# Patient Record
Sex: FEMALE | Race: WHITE | NOT HISPANIC OR LATINO | Employment: FULL TIME | URBAN - METROPOLITAN AREA
[De-identification: names, ages, dates, MRNs, and addresses within clinical notes are randomized per-mention and may not be internally consistent; named-entity substitution may affect disease eponyms.]

---

## 2023-03-07 DIAGNOSIS — E66.01 CLASS 3 SEVERE OBESITY DUE TO EXCESS CALORIES WITH SERIOUS COMORBIDITY AND BODY MASS INDEX (BMI) OF 40.0 TO 44.9 IN ADULT (MULTI): Primary | ICD-10-CM

## 2023-03-07 PROBLEM — R20.2 PARESTHESIA OF BOTH FEET: Status: ACTIVE | Noted: 2023-03-07

## 2023-03-07 PROBLEM — M54.12 CERVICAL RADICULITIS: Status: ACTIVE | Noted: 2022-12-02

## 2023-03-07 PROBLEM — E03.9 HYPOTHYROIDISM: Status: ACTIVE | Noted: 2023-03-07

## 2023-03-07 PROBLEM — R10.30 LOWER ABDOMINAL PAIN: Status: ACTIVE | Noted: 2023-03-07

## 2023-03-07 PROBLEM — R05.8 ACE-INHIBITOR COUGH: Status: ACTIVE | Noted: 2023-03-07

## 2023-03-07 PROBLEM — E11.40 DIABETIC NEUROPATHY (MULTI): Status: ACTIVE | Noted: 2023-03-07

## 2023-03-07 PROBLEM — Z85.41 HISTORY OF CERVICAL CANCER: Status: ACTIVE | Noted: 2023-03-07

## 2023-03-07 PROBLEM — M79.10 MUSCLE PAIN: Status: ACTIVE | Noted: 2023-03-07

## 2023-03-07 PROBLEM — M76.62 ACHILLES TENDINITIS OF LEFT LOWER EXTREMITY: Status: ACTIVE | Noted: 2023-03-07

## 2023-03-07 PROBLEM — R51.9 HEADACHE: Status: ACTIVE | Noted: 2023-03-07

## 2023-03-07 PROBLEM — R19.7 DIARRHEA, UNSPECIFIED: Status: ACTIVE | Noted: 2023-03-07

## 2023-03-07 PROBLEM — R42 DIZZINESS: Status: ACTIVE | Noted: 2023-03-07

## 2023-03-07 PROBLEM — E11.9 DIABETES MELLITUS (MULTI): Status: ACTIVE | Noted: 2023-03-07

## 2023-03-07 PROBLEM — T46.4X5A ACE-INHIBITOR COUGH: Status: ACTIVE | Noted: 2023-03-07

## 2023-03-07 PROBLEM — K21.9 GERD (GASTROESOPHAGEAL REFLUX DISEASE): Status: ACTIVE | Noted: 2023-03-07

## 2023-03-07 PROBLEM — M25.512 LEFT SHOULDER PAIN: Status: ACTIVE | Noted: 2023-03-07

## 2023-03-07 PROBLEM — D23.9 DYSPLASTIC NEVUS: Status: ACTIVE | Noted: 2023-03-07

## 2023-03-07 PROBLEM — R07.9 CHEST PAIN, EXERTIONAL: Status: ACTIVE | Noted: 2023-03-07

## 2023-03-07 PROBLEM — N20.0 KIDNEY STONE: Status: ACTIVE | Noted: 2023-03-07

## 2023-03-07 PROBLEM — E66.813 OBESITY, CLASS III, BMI 40-49.9 (MORBID OBESITY): Status: ACTIVE | Noted: 2023-03-07

## 2023-03-07 PROBLEM — N95.2 ATROPHY OF VAGINA: Status: ACTIVE | Noted: 2023-03-07

## 2023-03-07 PROBLEM — M89.9 LESION OF VERTEBRA: Status: ACTIVE | Noted: 2023-03-07

## 2023-03-07 PROBLEM — J06.9 URI (UPPER RESPIRATORY INFECTION): Status: ACTIVE | Noted: 2023-03-07

## 2023-03-07 PROBLEM — J20.9 ACUTE BRONCHITIS: Status: ACTIVE | Noted: 2023-03-07

## 2023-03-07 PROBLEM — K52.9 CHRONIC DIARRHEA: Status: ACTIVE | Noted: 2023-03-07

## 2023-03-07 PROBLEM — H66.90 OTITIS MEDIA: Status: ACTIVE | Noted: 2023-03-07

## 2023-03-07 PROBLEM — M16.9 OSTEOARTHRITIS OF HIP: Status: ACTIVE | Noted: 2023-03-07

## 2023-03-07 PROBLEM — B34.2 CORONAVIRUS INFECTION: Status: ACTIVE | Noted: 2022-11-29
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
## 2023-03-07 PROBLEM — H92.02 LEFT EAR PAIN: Status: ACTIVE | Noted: 2023-03-07

## 2023-03-07 PROBLEM — K31.9 LESION OF STOMACH: Status: ACTIVE | Noted: 2023-03-07

## 2023-03-07 PROBLEM — M25.552 LEFT HIP PAIN: Status: ACTIVE | Noted: 2023-03-07

## 2023-03-07 PROBLEM — I10 BENIGN ESSENTIAL HYPERTENSION: Status: ACTIVE | Noted: 2023-03-07

## 2023-03-07 PROBLEM — R07.9 CHEST PAIN: Status: ACTIVE | Noted: 2023-03-07

## 2023-03-07 PROBLEM — R05.9 COUGH: Status: ACTIVE | Noted: 2023-03-07

## 2023-03-07 PROBLEM — N63.20 MASS OF LEFT BREAST ON MAMMOGRAM: Status: ACTIVE | Noted: 2023-03-07

## 2023-03-07 PROBLEM — M54.2 NECK PAIN: Status: ACTIVE | Noted: 2023-03-07

## 2023-03-07 PROBLEM — J01.90 ACUTE SINUSITIS: Status: ACTIVE | Noted: 2023-03-07

## 2023-03-07 RX ORDER — POTASSIUM CITRATE 10 MEQ/1
10 TABLET, EXTENDED RELEASE ORAL
COMMUNITY
Start: 2017-06-12 | End: 2024-05-03 | Stop reason: WASHOUT

## 2023-03-07 RX ORDER — ATORVASTATIN CALCIUM 40 MG/1
40 TABLET, FILM COATED ORAL DAILY
COMMUNITY
Start: 2018-11-15 | End: 2023-08-28 | Stop reason: SDUPTHER

## 2023-03-07 RX ORDER — BLOOD SUGAR DIAGNOSTIC
STRIP MISCELLANEOUS
COMMUNITY
Start: 2022-08-23

## 2023-03-07 RX ORDER — SEMAGLUTIDE 1.34 MG/ML
1 INJECTION, SOLUTION SUBCUTANEOUS
Qty: 2 EACH | Refills: 3 | Status: SHIPPED | OUTPATIENT
Start: 2023-03-07 | End: 2023-03-16

## 2023-03-07 RX ORDER — LANCETS
EACH MISCELLANEOUS
COMMUNITY

## 2023-03-07 RX ORDER — LEVOTHYROXINE SODIUM 137 UG/1
137 TABLET ORAL DAILY
COMMUNITY
Start: 2014-06-13 | End: 2023-03-16 | Stop reason: SDUPTHER

## 2023-03-07 RX ORDER — GLIPIZIDE 10 MG/1
10 TABLET, FILM COATED, EXTENDED RELEASE ORAL 2 TIMES DAILY
COMMUNITY
Start: 2018-11-15 | End: 2023-06-01 | Stop reason: SDUPTHER

## 2023-03-07 RX ORDER — PIOGLITAZONEHYDROCHLORIDE 30 MG/1
30 TABLET ORAL DAILY
COMMUNITY
Start: 2020-12-17 | End: 2023-08-28 | Stop reason: SDUPTHER

## 2023-03-07 RX ORDER — LOSARTAN POTASSIUM 50 MG/1
50 TABLET ORAL DAILY
Status: CANCELLED | OUTPATIENT
Start: 2023-03-07

## 2023-03-07 RX ORDER — GABAPENTIN 300 MG/1
300 CAPSULE ORAL
COMMUNITY
Start: 2019-04-08 | End: 2023-12-22 | Stop reason: SDUPTHER

## 2023-03-07 RX ORDER — LEVOTHYROXINE SODIUM 137 UG/1
137 TABLET ORAL DAILY
Status: CANCELLED | OUTPATIENT
Start: 2023-03-07

## 2023-03-07 RX ORDER — IBUPROFEN 800 MG/1
800 TABLET ORAL EVERY 8 HOURS PRN
COMMUNITY

## 2023-03-07 RX ORDER — PREDNISONE 20 MG/1
TABLET ORAL
COMMUNITY
Start: 2022-10-28 | End: 2023-03-16 | Stop reason: ALTCHOICE

## 2023-03-07 RX ORDER — SEMAGLUTIDE 1.34 MG/ML
INJECTION, SOLUTION SUBCUTANEOUS
COMMUNITY
Start: 2022-06-13 | End: 2023-03-16

## 2023-03-07 RX ORDER — ALLOPURINOL 300 MG/1
300 TABLET ORAL DAILY
COMMUNITY
Start: 2017-06-12 | End: 2023-05-22 | Stop reason: SDUPTHER

## 2023-03-07 RX ORDER — CYCLOBENZAPRINE HCL 10 MG
1 TABLET ORAL 2 TIMES DAILY
COMMUNITY
Start: 2022-10-28 | End: 2023-05-25 | Stop reason: ALTCHOICE

## 2023-03-07 RX ORDER — LOSARTAN POTASSIUM 50 MG/1
50 TABLET ORAL DAILY
COMMUNITY
Start: 2019-04-08 | End: 2023-03-16 | Stop reason: SDUPTHER

## 2023-03-07 RX ORDER — DICYCLOMINE HYDROCHLORIDE 10 MG/1
10 CAPSULE ORAL
COMMUNITY
Start: 2018-12-14 | End: 2023-10-02 | Stop reason: SDUPTHER

## 2023-03-07 RX ORDER — SEMAGLUTIDE 1.34 MG/ML
INJECTION, SOLUTION SUBCUTANEOUS
COMMUNITY
Start: 2022-07-10 | End: 2023-03-07 | Stop reason: SDUPTHER

## 2023-03-07 NOTE — TELEPHONE ENCOUNTER
Refill    Ozempic 4mg solution inj 1mg once a week     Pharm: [facility] # [phone]    LR: 07/14/22 qty 3 w/ 1 refill  LV: 11/29/22  NV: no future apt

## 2023-03-16 ENCOUNTER — OFFICE VISIT (OUTPATIENT)
Dept: PRIMARY CARE | Facility: CLINIC | Age: 68
End: 2023-03-16
Payer: MEDICARE

## 2023-03-16 VITALS
SYSTOLIC BLOOD PRESSURE: 130 MMHG | DIASTOLIC BLOOD PRESSURE: 90 MMHG | WEIGHT: 250 LBS | HEIGHT: 64 IN | TEMPERATURE: 97.2 F | BODY MASS INDEX: 42.68 KG/M2

## 2023-03-16 DIAGNOSIS — E03.9 ACQUIRED HYPOTHYROIDISM: ICD-10-CM

## 2023-03-16 DIAGNOSIS — E66.01 OBESITY, CLASS III, BMI 40-49.9 (MORBID OBESITY) (MULTI): ICD-10-CM

## 2023-03-16 DIAGNOSIS — E11.69 TYPE 2 DIABETES MELLITUS WITH OTHER SPECIFIED COMPLICATION, WITHOUT LONG-TERM CURRENT USE OF INSULIN (MULTI): Primary | ICD-10-CM

## 2023-03-16 DIAGNOSIS — E11.42 DIABETIC POLYNEUROPATHY ASSOCIATED WITH TYPE 2 DIABETES MELLITUS (MULTI): ICD-10-CM

## 2023-03-16 DIAGNOSIS — I10 BENIGN ESSENTIAL HYPERTENSION: ICD-10-CM

## 2023-03-16 LAB
BASOPHILS (10*3/UL) IN BLOOD BY AUTOMATED COUNT: 0.03 X10E9/L (ref 0–0.1)
BASOPHILS/100 LEUKOCYTES IN BLOOD BY AUTOMATED COUNT: 0.4 % (ref 0–2)
EOSINOPHILS (10*3/UL) IN BLOOD BY AUTOMATED COUNT: 0.06 X10E9/L (ref 0–0.7)
EOSINOPHILS/100 LEUKOCYTES IN BLOOD BY AUTOMATED COUNT: 0.8 % (ref 0–6)
ERYTHROCYTE DISTRIBUTION WIDTH (RATIO) BY AUTOMATED COUNT: 12.6 % (ref 11.5–14.5)
ERYTHROCYTE MEAN CORPUSCULAR HEMOGLOBIN CONCENTRATION (G/DL) BY AUTOMATED: 32.2 G/DL (ref 32–36)
ERYTHROCYTE MEAN CORPUSCULAR VOLUME (FL) BY AUTOMATED COUNT: 97 FL (ref 80–100)
ERYTHROCYTES (10*6/UL) IN BLOOD BY AUTOMATED COUNT: 4.3 X10E12/L (ref 4–5.2)
ESTIMATED AVERAGE GLUCOSE FOR HBA1C: 137 MG/DL
HEMATOCRIT (%) IN BLOOD BY AUTOMATED COUNT: 41.6 % (ref 36–46)
HEMOGLOBIN (G/DL) IN BLOOD: 13.4 G/DL (ref 12–16)
HEMOGLOBIN A1C/HEMOGLOBIN TOTAL IN BLOOD: 6.4 %
IMMATURE GRANULOCYTES/100 LEUKOCYTES IN BLOOD BY AUTOMATED COUNT: 0.1 % (ref 0–0.9)
LEUKOCYTES (10*3/UL) IN BLOOD BY AUTOMATED COUNT: 7.1 X10E9/L (ref 4.4–11.3)
LYMPHOCYTES (10*3/UL) IN BLOOD BY AUTOMATED COUNT: 2.11 X10E9/L (ref 1.2–4.8)
LYMPHOCYTES/100 LEUKOCYTES IN BLOOD BY AUTOMATED COUNT: 29.8 % (ref 13–44)
MONOCYTES (10*3/UL) IN BLOOD BY AUTOMATED COUNT: 0.72 X10E9/L (ref 0.1–1)
MONOCYTES/100 LEUKOCYTES IN BLOOD BY AUTOMATED COUNT: 10.2 % (ref 2–10)
NEUTROPHILS (10*3/UL) IN BLOOD BY AUTOMATED COUNT: 4.16 X10E9/L (ref 1.2–7.7)
NEUTROPHILS/100 LEUKOCYTES IN BLOOD BY AUTOMATED COUNT: 58.7 % (ref 40–80)
NRBC (PER 100 WBCS) BY AUTOMATED COUNT: 0 /100 WBC (ref 0–0)
PLATELETS (10*3/UL) IN BLOOD AUTOMATED COUNT: 253 X10E9/L (ref 150–450)

## 2023-03-16 PROCEDURE — 3080F DIAST BP >= 90 MM HG: CPT | Performed by: FAMILY MEDICINE

## 2023-03-16 PROCEDURE — 84443 ASSAY THYROID STIM HORMONE: CPT

## 2023-03-16 PROCEDURE — 83036 HEMOGLOBIN GLYCOSYLATED A1C: CPT

## 2023-03-16 PROCEDURE — 85025 COMPLETE CBC W/AUTO DIFF WBC: CPT

## 2023-03-16 PROCEDURE — 99214 OFFICE O/P EST MOD 30 MIN: CPT | Performed by: FAMILY MEDICINE

## 2023-03-16 PROCEDURE — 80053 COMPREHEN METABOLIC PANEL: CPT

## 2023-03-16 PROCEDURE — 36415 COLL VENOUS BLD VENIPUNCTURE: CPT | Performed by: FAMILY MEDICINE

## 2023-03-16 PROCEDURE — 3075F SYST BP GE 130 - 139MM HG: CPT | Performed by: FAMILY MEDICINE

## 2023-03-16 PROCEDURE — 1159F MED LIST DOCD IN RCRD: CPT | Performed by: FAMILY MEDICINE

## 2023-03-16 PROCEDURE — 1160F RVW MEDS BY RX/DR IN RCRD: CPT | Performed by: FAMILY MEDICINE

## 2023-03-16 PROCEDURE — 80061 LIPID PANEL: CPT

## 2023-03-16 PROCEDURE — 1036F TOBACCO NON-USER: CPT | Performed by: FAMILY MEDICINE

## 2023-03-16 PROCEDURE — 4010F ACE/ARB THERAPY RXD/TAKEN: CPT | Performed by: FAMILY MEDICINE

## 2023-03-16 PROCEDURE — 3008F BODY MASS INDEX DOCD: CPT | Performed by: FAMILY MEDICINE

## 2023-03-16 RX ORDER — LOSARTAN POTASSIUM 50 MG/1
50 TABLET ORAL DAILY
Qty: 90 TABLET | Refills: 1 | Status: SHIPPED | OUTPATIENT
Start: 2023-03-16 | End: 2023-05-25 | Stop reason: SDUPTHER

## 2023-03-16 RX ORDER — LEVOTHYROXINE SODIUM 137 UG/1
137 TABLET ORAL DAILY
Qty: 90 TABLET | Refills: 1 | Status: SHIPPED | OUTPATIENT
Start: 2023-03-16 | End: 2023-10-02 | Stop reason: SDUPTHER

## 2023-03-16 ASSESSMENT — ENCOUNTER SYMPTOMS
CONSTIPATION: 0
FEVER: 0
DIZZINESS: 0
NUMBNESS: 1
HEMATURIA: 0
FATIGUE: 0
HEADACHES: 0
ABDOMINAL PAIN: 0
COUGH: 0
DYSURIA: 0
SHORTNESS OF BREATH: 0
BLOOD IN STOOL: 0
LIGHT-HEADEDNESS: 0

## 2023-03-16 ASSESSMENT — PATIENT HEALTH QUESTIONNAIRE - PHQ9
1. LITTLE INTEREST OR PLEASURE IN DOING THINGS: NOT AT ALL
SUM OF ALL RESPONSES TO PHQ9 QUESTIONS 1 AND 2: 0
2. FEELING DOWN, DEPRESSED OR HOPELESS: NOT AT ALL

## 2023-03-16 NOTE — PATIENT INSTRUCTIONS
I will refill medication and order labs to recheck your diabetes and your thyroid.  Weight loss is recommended for your good health.  I will refer you to a nutritionist.  I recommend cutting portion sizes and avoiding foods high in sugars and starches.  Return in three months for a recheck.  We will stop the Ozempic due to the cost of this medication.

## 2023-03-16 NOTE — PROGRESS NOTES
"Subjective   Patient ID: [id number]     Mary Ellen Gomez is a 67 y.o. female who presents for Follow-up (Would like to discuss her Ozempic rx ).  HPI  She is here for a recheck.  She is prescribed Ozempic.  She states it is too expensive at $500 per month out of pocket.  She has been on it up until last week.      BMI 42.9.  Weight is unchanged since November 2022.      Her last A1c was 7.4 in March 2022.  /90.    Review of Systems   Constitutional:  Negative for fatigue and fever.   Respiratory:  Negative for cough and shortness of breath.    Cardiovascular:  Negative for chest pain.   Gastrointestinal:  Negative for abdominal pain, blood in stool and constipation.        Some loose stools lately.   Genitourinary:  Negative for dysuria and hematuria.   Skin:         No wounds or sores on the feet.   Neurological:  Positive for numbness (in the toes of both feet. more on the right.). Negative for dizziness, light-headedness and headaches.      Objective     /90 (BP Location: Right arm, Patient Position: Sitting)   Temp 36.2 °C (97.2 °F)   Ht 1.626 m (5' 4\")   Wt 113 kg (250 lb)   BMI 42.91 kg/m²      Physical Exam  Constitutional:       Appearance: Normal appearance.   Cardiovascular:      Rate and Rhythm: Normal rate and regular rhythm.   Pulmonary:      Effort: Pulmonary effort is normal.      Breath sounds: Normal breath sounds.   Musculoskeletal:      Right lower leg: No edema.      Left lower leg: No edema.   Neurological:      General: No focal deficit present.      Mental Status: She is alert and oriented to person, place, and time.   Psychiatric:         Mood and Affect: Mood normal.         Assessment/Plan   Problem List Items Addressed This Visit          Nervous    Diabetic neuropathy (CMS/HCC)       Circulatory    Benign essential hypertension    Relevant Medications    losartan (Cozaar) 50 mg tablet       Endocrine/Metabolic    Diabetes mellitus (CMS/HCC) - Primary    Relevant Orders    " Hemoglobin A1c    Lipid Panel    Comprehensive Metabolic Panel    CBC and Auto Differential    Referral to Nutrition Services    Hypothyroidism    Relevant Medications    levothyroxine (Synthroid, Levoxyl) 137 mcg tablet    Other Relevant Orders    Thyroid Stimulating Hormone    Obesity, Class III, BMI 40-49.9 (morbid obesity) (CMS/[facility])    Relevant Orders    Referral to Nutrition Services     I will refill medication and order labs to recheck your diabetes and your thyroid.  Weight loss is recommended for your good health.  I will refer you to a nutritionist.  I recommend cutting portion sizes and avoiding foods high in sugars and starches.  Return in three months for a recheck.  We will stop the Ozempic due to the cost of this medication.  Ponce Amos, DO

## 2023-03-17 ENCOUNTER — TELEPHONE (OUTPATIENT)
Dept: PRIMARY CARE | Facility: CLINIC | Age: 68
End: 2023-03-17
Payer: MEDICARE

## 2023-03-17 LAB
ALANINE AMINOTRANSFERASE (SGPT) (U/L) IN SER/PLAS: 16 U/L (ref 7–45)
ALBUMIN (G/DL) IN SER/PLAS: 4 G/DL (ref 3.4–5)
ALKALINE PHOSPHATASE (U/L) IN SER/PLAS: 54 U/L (ref 33–136)
ANION GAP IN SER/PLAS: 16 MMOL/L (ref 10–20)
ASPARTATE AMINOTRANSFERASE (SGOT) (U/L) IN SER/PLAS: 14 U/L (ref 9–39)
BILIRUBIN TOTAL (MG/DL) IN SER/PLAS: 0.9 MG/DL (ref 0–1.2)
CALCIUM (MG/DL) IN SER/PLAS: 9.2 MG/DL (ref 8.6–10.3)
CARBON DIOXIDE, TOTAL (MMOL/L) IN SER/PLAS: 24 MMOL/L (ref 21–32)
CHLORIDE (MMOL/L) IN SER/PLAS: 107 MMOL/L (ref 98–107)
CHOLESTEROL (MG/DL) IN SER/PLAS: 139 MG/DL (ref 0–199)
CHOLESTEROL IN HDL (MG/DL) IN SER/PLAS: 49.7 MG/DL
CHOLESTEROL/HDL RATIO: 2.8
CREATININE (MG/DL) IN SER/PLAS: 0.89 MG/DL (ref 0.5–1.05)
GFR FEMALE: 71 ML/MIN/1.73M2
GLUCOSE (MG/DL) IN SER/PLAS: 123 MG/DL (ref 74–99)
LDL: 70 MG/DL (ref 0–99)
POTASSIUM (MMOL/L) IN SER/PLAS: 4.5 MMOL/L (ref 3.5–5.3)
PROTEIN TOTAL: 6.5 G/DL (ref 6.4–8.2)
SODIUM (MMOL/L) IN SER/PLAS: 142 MMOL/L (ref 136–145)
THYROTROPIN (MIU/L) IN SER/PLAS BY DETECTION LIMIT <= 0.05 MIU/L: 0.58 MIU/L (ref 0.44–3.98)
TRIGLYCERIDE (MG/DL) IN SER/PLAS: 95 MG/DL (ref 0–149)
UREA NITROGEN (MG/DL) IN SER/PLAS: 22 MG/DL (ref 6–23)
VLDL: 19 MG/DL (ref 0–40)

## 2023-03-17 NOTE — TELEPHONE ENCOUNTER
Lab results discussed with patient.      ----- Message from Ponce Amos DO sent at 3/17/2023  2:51 PM EDT -----  Please let her know her labs showed no significant problems.  Her diabetes is well controlled.

## 2023-05-22 ENCOUNTER — TELEPHONE (OUTPATIENT)
Dept: PRIMARY CARE | Facility: CLINIC | Age: 68
End: 2023-05-22
Payer: MEDICARE

## 2023-05-22 DIAGNOSIS — M10.9 GOUT, UNSPECIFIED CAUSE, UNSPECIFIED CHRONICITY, UNSPECIFIED SITE: Primary | ICD-10-CM

## 2023-05-22 RX ORDER — ALLOPURINOL 300 MG/1
300 TABLET ORAL DAILY
Qty: 90 TABLET | Refills: 0 | Status: SHIPPED | OUTPATIENT
Start: 2023-05-22 | End: 2023-09-05 | Stop reason: SDUPTHER

## 2023-05-22 NOTE — TELEPHONE ENCOUNTER
Refill Allopurinol 300mg 1qd    [facility]: [facility]     LR: 11/17/22  LV: 03/16/23  NV: 05/25/23

## 2023-05-25 ENCOUNTER — OFFICE VISIT (OUTPATIENT)
Dept: PRIMARY CARE | Facility: CLINIC | Age: 68
End: 2023-05-25
Payer: MEDICARE

## 2023-05-25 VITALS
WEIGHT: 255 LBS | DIASTOLIC BLOOD PRESSURE: 90 MMHG | TEMPERATURE: 98.5 F | BODY MASS INDEX: 43.77 KG/M2 | SYSTOLIC BLOOD PRESSURE: 150 MMHG

## 2023-05-25 DIAGNOSIS — L70.0 ACNE VULGARIS: ICD-10-CM

## 2023-05-25 DIAGNOSIS — E11.42 DIABETIC POLYNEUROPATHY ASSOCIATED WITH TYPE 2 DIABETES MELLITUS (MULTI): Primary | ICD-10-CM

## 2023-05-25 DIAGNOSIS — I10 HYPERTENSION, UNSPECIFIED TYPE: ICD-10-CM

## 2023-05-25 DIAGNOSIS — E66.01 OBESITY, CLASS III, BMI 40-49.9 (MORBID OBESITY) (MULTI): ICD-10-CM

## 2023-05-25 DIAGNOSIS — I10 BENIGN ESSENTIAL HYPERTENSION: ICD-10-CM

## 2023-05-25 PROCEDURE — 1159F MED LIST DOCD IN RCRD: CPT | Performed by: FAMILY MEDICINE

## 2023-05-25 PROCEDURE — 99214 OFFICE O/P EST MOD 30 MIN: CPT | Performed by: FAMILY MEDICINE

## 2023-05-25 PROCEDURE — 1036F TOBACCO NON-USER: CPT | Performed by: FAMILY MEDICINE

## 2023-05-25 PROCEDURE — 1160F RVW MEDS BY RX/DR IN RCRD: CPT | Performed by: FAMILY MEDICINE

## 2023-05-25 PROCEDURE — 3077F SYST BP >= 140 MM HG: CPT | Performed by: FAMILY MEDICINE

## 2023-05-25 PROCEDURE — 3080F DIAST BP >= 90 MM HG: CPT | Performed by: FAMILY MEDICINE

## 2023-05-25 PROCEDURE — 3008F BODY MASS INDEX DOCD: CPT | Performed by: FAMILY MEDICINE

## 2023-05-25 PROCEDURE — 4010F ACE/ARB THERAPY RXD/TAKEN: CPT | Performed by: FAMILY MEDICINE

## 2023-05-25 PROCEDURE — 3044F HG A1C LEVEL LT 7.0%: CPT | Performed by: FAMILY MEDICINE

## 2023-05-25 RX ORDER — BENZOYL PEROXIDE 100 MG/ML
LIQUID TOPICAL 2 TIMES DAILY
Qty: 148 G | Refills: 1 | Status: SHIPPED | OUTPATIENT
Start: 2023-05-25 | End: 2024-05-24

## 2023-05-25 RX ORDER — TIRZEPATIDE 2.5 MG/.5ML
2.5 INJECTION, SOLUTION SUBCUTANEOUS
Qty: 2 ML | Refills: 1 | Status: SHIPPED | OUTPATIENT
Start: 2023-05-25 | End: 2023-05-31

## 2023-05-25 RX ORDER — LOSARTAN POTASSIUM 100 MG/1
100 TABLET ORAL DAILY
Qty: 90 TABLET | Refills: 1 | Status: SHIPPED | OUTPATIENT
Start: 2023-05-25 | End: 2023-11-28 | Stop reason: SDUPTHER

## 2023-05-25 ASSESSMENT — PATIENT HEALTH QUESTIONNAIRE - PHQ9
1. LITTLE INTEREST OR PLEASURE IN DOING THINGS: NOT AT ALL
2. FEELING DOWN, DEPRESSED OR HOPELESS: NOT AT ALL
SUM OF ALL RESPONSES TO PHQ9 QUESTIONS 1 AND 2: 0

## 2023-05-25 NOTE — PROGRESS NOTES
Subjective   Patient ID: [id number]     Mary Ellen Gomez is a 68 y.o. female who presents for Discuss Medication.  HPI  She is here to discuss medication.      She has gained five pounds from last visit.  BMI is 43.      She was on Ozempic earlier this year but she stopped it about three months ago due to its cost ($500 per month).     She enjoyed being on Ozempic.  It helped lower her appetite.  No side effects.      She remains on atorvastatin, gabapentin, glipizide, levothyroxine, losartan, pioglitazone, allopurinol, dicyclomine, ibuprofen (doesn't take it much), and potassium.      She is asking for a prescription face wash because of breaking out on her face.  Objective     /90 (BP Location: Right arm, Patient Position: Sitting)   Temp 36.9 °C (98.5 °F)   Wt 116 kg (255 lb)   BMI 43.77 kg/m²      Physical Exam  Constitutional:       Appearance: Normal appearance.   Cardiovascular:      Rate and Rhythm: Normal rate and regular rhythm.      Heart sounds: Normal heart sounds.   Pulmonary:      Effort: Pulmonary effort is normal.      Breath sounds: Normal breath sounds.   Skin:     Comments: Face--breaking out mostly covered with make up.   Neurological:      General: No focal deficit present.      Mental Status: She is oriented to person, place, and time.         Assessment/Plan   Problem List Items Addressed This Visit          Nervous    Diabetic neuropathy (CMS/HCC) - Primary    Relevant Medications    tirzepatide (Mounjaro) 2.5 mg/0.5 mL pen injector       Circulatory    Benign essential hypertension    Relevant Medications    losartan (Cozaar) 100 mg tablet       Endocrine/Metabolic    Obesity, Class III, BMI 40-49.9 (morbid obesity) (CMS/HCC)     Other Visit Diagnoses       Hypertension, unspecified type        Acne vulgaris        Relevant Medications    benzoyl peroxide (PanoxyL) 10 % external wash        I will start you on Mounjaro, 2.5 mg per week in a subcutaneous injection.  Try to avoid sugars  and starches in the diet and decreased portion. You are scheduled to see a dietician.  I increased your losartan from 50 mg to 100 mg daily.  I prescribed a benzoyl peroxide wash for your face.  Return in one month for a recheck and sooner if there are any new or worsened problems.    Ponce Amos, DO

## 2023-05-25 NOTE — PATIENT INSTRUCTIONS
I will start you on Mounjaro, 2.5 mg per week in a subcutaneous injection.  Try to avoid sugars and starches in the diet and decreased portion. You are scheduled to see a dietician.  I increased your losartan from 50 mg to 100 mg daily.  I prescribed a benzoyl peroxide wash for your face.  Return in one month for a recheck and sooner if there are any new or worsened problems.

## 2023-05-31 ENCOUNTER — TELEPHONE (OUTPATIENT)
Dept: PRIMARY CARE | Facility: CLINIC | Age: 68
End: 2023-05-31
Payer: MEDICARE

## 2023-05-31 DIAGNOSIS — E66.01 OBESITY, CLASS III, BMI 40-49.9 (MORBID OBESITY) (MULTI): ICD-10-CM

## 2023-05-31 DIAGNOSIS — E11.69 TYPE 2 DIABETES MELLITUS WITH OTHER SPECIFIED COMPLICATION, WITHOUT LONG-TERM CURRENT USE OF INSULIN (MULTI): Primary | ICD-10-CM

## 2023-05-31 RX ORDER — DULAGLUTIDE 0.75 MG/.5ML
0.75 INJECTION, SOLUTION SUBCUTANEOUS
Qty: 2 ML | Refills: 1 | Status: SHIPPED | OUTPATIENT
Start: 2023-05-31 | End: 2024-05-03 | Stop reason: WASHOUT

## 2023-06-01 ENCOUNTER — TELEPHONE (OUTPATIENT)
Dept: PRIMARY CARE | Facility: CLINIC | Age: 68
End: 2023-06-01
Payer: MEDICARE

## 2023-06-01 DIAGNOSIS — E11.69 TYPE 2 DIABETES MELLITUS WITH OTHER SPECIFIED COMPLICATION, WITHOUT LONG-TERM CURRENT USE OF INSULIN (MULTI): Primary | ICD-10-CM

## 2023-06-01 RX ORDER — GLIPIZIDE 10 MG/1
10 TABLET, FILM COATED, EXTENDED RELEASE ORAL 2 TIMES DAILY
Qty: 180 TABLET | Refills: 0 | Status: SHIPPED | OUTPATIENT
Start: 2023-06-01 | End: 2023-09-11 | Stop reason: SDUPTHER

## 2023-06-01 NOTE — TELEPHONE ENCOUNTER
Pt aware   
Pt is calling her Ins will not cover Mounjaro, but they will cover Trulicity. Pt would like to know if you can sen it to her pharm [facility] # [phone].   
None

## 2023-06-01 NOTE — TELEPHONE ENCOUNTER
Refill:     Glipizide ER 10mg take one tab twice a day     Pharm: [facility]# [phone]    LR: 11/17/23 qty 180 w/ 1 refill  LV: 05/25/23  NV: 06/23/23

## 2023-06-23 ENCOUNTER — APPOINTMENT (OUTPATIENT)
Dept: PRIMARY CARE | Facility: CLINIC | Age: 68
End: 2023-06-23
Payer: MEDICARE

## 2023-07-04 ENCOUNTER — HOSPITAL ENCOUNTER (OUTPATIENT)
Dept: DATA CONVERSION | Facility: HOSPITAL | Age: 68
End: 2023-07-04
Attending: EMERGENCY MEDICINE

## 2023-07-04 DIAGNOSIS — G50.0 TRIGEMINAL NEURALGIA: ICD-10-CM

## 2023-07-04 DIAGNOSIS — Z79.82 LONG TERM (CURRENT) USE OF ASPIRIN: ICD-10-CM

## 2023-07-04 DIAGNOSIS — Z79.84 LONG TERM (CURRENT) USE OF ORAL HYPOGLYCEMIC DRUGS: ICD-10-CM

## 2023-07-04 DIAGNOSIS — E11.9 TYPE 2 DIABETES MELLITUS WITHOUT COMPLICATIONS (MULTI): ICD-10-CM

## 2023-07-04 DIAGNOSIS — R51.9 HEADACHE, UNSPECIFIED: ICD-10-CM

## 2023-07-17 ENCOUNTER — OFFICE VISIT (OUTPATIENT)
Dept: PRIMARY CARE | Facility: CLINIC | Age: 68
End: 2023-07-17
Payer: MEDICARE

## 2023-07-17 VITALS
WEIGHT: 255 LBS | BODY MASS INDEX: 43.77 KG/M2 | TEMPERATURE: 98.4 F | DIASTOLIC BLOOD PRESSURE: 80 MMHG | SYSTOLIC BLOOD PRESSURE: 128 MMHG

## 2023-07-17 DIAGNOSIS — R20.2 FACIAL PARESTHESIA: Primary | ICD-10-CM

## 2023-07-17 PROCEDURE — 99214 OFFICE O/P EST MOD 30 MIN: CPT | Performed by: FAMILY MEDICINE

## 2023-07-17 PROCEDURE — 3074F SYST BP LT 130 MM HG: CPT | Performed by: FAMILY MEDICINE

## 2023-07-17 PROCEDURE — 3044F HG A1C LEVEL LT 7.0%: CPT | Performed by: FAMILY MEDICINE

## 2023-07-17 PROCEDURE — 4010F ACE/ARB THERAPY RXD/TAKEN: CPT | Performed by: FAMILY MEDICINE

## 2023-07-17 PROCEDURE — 3079F DIAST BP 80-89 MM HG: CPT | Performed by: FAMILY MEDICINE

## 2023-07-17 PROCEDURE — 1160F RVW MEDS BY RX/DR IN RCRD: CPT | Performed by: FAMILY MEDICINE

## 2023-07-17 PROCEDURE — 1036F TOBACCO NON-USER: CPT | Performed by: FAMILY MEDICINE

## 2023-07-17 PROCEDURE — 3008F BODY MASS INDEX DOCD: CPT | Performed by: FAMILY MEDICINE

## 2023-07-17 PROCEDURE — 1159F MED LIST DOCD IN RCRD: CPT | Performed by: FAMILY MEDICINE

## 2023-07-17 RX ORDER — CARBAMAZEPINE 100 MG/1
100 CAPSULE, EXTENDED RELEASE ORAL 2 TIMES DAILY
COMMUNITY
End: 2023-07-17 | Stop reason: SDUPTHER

## 2023-07-17 RX ORDER — CARBAMAZEPINE 100 MG/1
100 CAPSULE, EXTENDED RELEASE ORAL NIGHTLY
Qty: 30 CAPSULE | Refills: 2 | Status: SHIPPED | OUTPATIENT
Start: 2023-07-17 | End: 2023-08-16 | Stop reason: SDUPTHER

## 2023-07-17 ASSESSMENT — PATIENT HEALTH QUESTIONNAIRE - PHQ9
2. FEELING DOWN, DEPRESSED OR HOPELESS: NOT AT ALL
1. LITTLE INTEREST OR PLEASURE IN DOING THINGS: NOT AT ALL
SUM OF ALL RESPONSES TO PHQ9 QUESTIONS 1 AND 2: 0

## 2023-07-17 ASSESSMENT — ENCOUNTER SYMPTOMS: DEPRESSION: 0

## 2023-07-17 NOTE — PATIENT INSTRUCTIONS
I will refill your carbamazepine.  You state that once a day is enough to control your symptoms.  Follow up with neurology as directed.  An MRI of the brain could be ordered but as long the carbamazepine seems to help with your symptoms, it is ok to stay on it and await the neurology consult, which is scheduled in October.  Return in one or two months for a recheck.  Your neurological exam remains normal.

## 2023-07-17 NOTE — PROGRESS NOTES
"Subjective   Patient ID: [id number]     Mary Ellen Gomez is a 68 y.o. female who presents for Hospital Follow-up.  HPI  She was seen in the ER 7/4/23.  She had electric shock sensations worsening of a couple of days.  Neuro exam was negative.  Was told it was not likely to be a stroke and probably trigeminal neuralgia.  She was discharged from the ER to home.      She states it is in the left frontal region.  It feels like she is being \"zapped\" with electricity.     Slight headache.     She was given tegretol.  It has helped.  If she stops it it comes right back.  With restarting it it goes away.      She denies any rash.      No weakness/motor deficit.  No double vision.  No blurry vision.  No problems finding words.  No slurred speech.        Objective     /80 (BP Location: Right arm, Patient Position: Sitting)   Temp 36.9 °C (98.4 °F)   Wt 116 kg (255 lb)   BMI 43.77 kg/m²      Physical Exam  Constitutional:       Appearance: Normal appearance.   Eyes:      Extraocular Movements: Extraocular movements intact.      Conjunctiva/sclera: Conjunctivae normal.      Pupils: Pupils are equal, round, and reactive to light.   Cardiovascular:      Rate and Rhythm: Normal rate and regular rhythm.      Heart sounds: Normal heart sounds.   Pulmonary:      Effort: Pulmonary effort is normal. No respiratory distress.      Breath sounds: Normal breath sounds.   Skin:     Comments: No rash. No signs of shingles.   Neurological:      General: No focal deficit present.      Mental Status: She is alert and oriented to person, place, and time.      Cranial Nerves: No cranial nerve deficit.      Sensory: No sensory deficit.      Motor: No weakness.      Coordination: Coordination normal.      Gait: Gait normal.         Assessment/Plan   Problem List Items Addressed This Visit    None  Visit Diagnoses       Facial paresthesia    -  Primary    Relevant Medications    carBAMazepine (Carbatrol) 100 mg 12 hr capsule        I will " refill your carbamazepine.  You state that once a day is enough to control your symptoms.  Follow up with neurology as directed.  An MRI of the brain could be ordered but as long the carbamazepine seems to help with your symptoms, it is ok to stay on it and await the neurology consult, which is scheduled in October.  Return in one or two months for a recheck.  Your neurological exam remains normal.    Ponce Amos, DO

## 2023-07-27 ENCOUNTER — TELEPHONE (OUTPATIENT)
Dept: PRIMARY CARE | Facility: CLINIC | Age: 68
End: 2023-07-27
Payer: MEDICARE

## 2023-07-27 DIAGNOSIS — G44.099 OTHER TRIGEMINAL AUTONOMIC CEPHALGIA (TAC), NOT INTRACTABLE: ICD-10-CM

## 2023-07-27 DIAGNOSIS — G50.0 TRIGEMINAL NEURALGIA: ICD-10-CM

## 2023-07-27 DIAGNOSIS — R20.2 FACIAL PARESTHESIA: Primary | ICD-10-CM

## 2023-07-27 NOTE — TELEPHONE ENCOUNTER
Pt is calling in regards to getting her MRI order. Pt would like to know if you can put the order in for her MRI? Pt said she has had 4 episodes just this week. She had 2 episodes yesterday one in the morning and one in the afternoon. Pt said she was having zapping in her head.

## 2023-08-16 ENCOUNTER — TELEPHONE (OUTPATIENT)
Dept: PRIMARY CARE | Facility: CLINIC | Age: 68
End: 2023-08-16
Payer: MEDICARE

## 2023-08-16 DIAGNOSIS — R20.2 FACIAL PARESTHESIA: ICD-10-CM

## 2023-08-16 RX ORDER — CARBAMAZEPINE 100 MG/1
100 CAPSULE, EXTENDED RELEASE ORAL 2 TIMES DAILY
Qty: 60 CAPSULE | Refills: 2 | Status: SHIPPED | OUTPATIENT
Start: 2023-08-16 | End: 2023-10-20 | Stop reason: ALTCHOICE

## 2023-08-16 NOTE — TELEPHONE ENCOUNTER
Pt is calling she states that she thought at her appt on 07/17/23 that she discussed that the ER gave her a script for CarBAMazepine (Carbatrol) 100 mg 12 hr capsule one capsule twice a day. Pt went to  the script and it was written for one capsule daily. Pt states that when she only take one capsule daily she starts to have episodes of the zapping in her head. Please call pt to clarify and resend script to pts pharm if it is suppose to be twice a day. Pts pharm is  # [phone]. Thank you

## 2023-08-28 DIAGNOSIS — E11.69 TYPE 2 DIABETES MELLITUS WITH OTHER SPECIFIED COMPLICATION, WITHOUT LONG-TERM CURRENT USE OF INSULIN (MULTI): Primary | ICD-10-CM

## 2023-08-28 RX ORDER — ATORVASTATIN CALCIUM 40 MG/1
40 TABLET, FILM COATED ORAL DAILY
Qty: 90 TABLET | Refills: 0 | Status: SHIPPED | OUTPATIENT
Start: 2023-08-28 | End: 2023-11-20 | Stop reason: SDUPTHER

## 2023-08-28 RX ORDER — PIOGLITAZONEHYDROCHLORIDE 30 MG/1
30 TABLET ORAL DAILY
Qty: 90 TABLET | Refills: 0 | Status: SHIPPED | OUTPATIENT
Start: 2023-08-28 | End: 2023-10-20 | Stop reason: WASHOUT

## 2023-08-28 NOTE — TELEPHONE ENCOUNTER
Pt is requesting a refill on    Atorvastatin 40 mg 1 po every day    Pioglitazone 30 mg 1 po every day        [phone]   7/1/23

## 2023-09-05 DIAGNOSIS — M10.9 GOUT, UNSPECIFIED CAUSE, UNSPECIFIED CHRONICITY, UNSPECIFIED SITE: ICD-10-CM

## 2023-09-05 RX ORDER — ALLOPURINOL 300 MG/1
300 TABLET ORAL DAILY
Qty: 90 TABLET | Refills: 0 | Status: SHIPPED | OUTPATIENT
Start: 2023-09-05 | End: 2023-12-04 | Stop reason: SDUPTHER

## 2023-09-05 NOTE — TELEPHONE ENCOUNTER
Pt is requesting a refill on    Allopurinol 300mg 1 po every day  LR 5/22/23 # 90     [facility]  [phone]  NV 7/17/23

## 2023-09-11 DIAGNOSIS — E11.69 TYPE 2 DIABETES MELLITUS WITH OTHER SPECIFIED COMPLICATION, WITHOUT LONG-TERM CURRENT USE OF INSULIN (MULTI): ICD-10-CM

## 2023-09-11 RX ORDER — GLIPIZIDE 10 MG/1
10 TABLET, FILM COATED, EXTENDED RELEASE ORAL 2 TIMES DAILY
Qty: 180 TABLET | Refills: 0 | Status: SHIPPED | OUTPATIENT
Start: 2023-09-11 | End: 2023-12-22 | Stop reason: SDUPTHER

## 2023-09-11 NOTE — TELEPHONE ENCOUNTER
Pt requesting refill    Glipizide XL 10mg BID   [phone]  Last refill 6/1/23  Last appt 7/17/23   No future appt has been scheduled

## 2023-09-20 PROBLEM — R19.7 DIARRHEA, UNSPECIFIED: Status: RESOLVED | Noted: 2023-03-07 | Resolved: 2023-09-20

## 2023-09-20 PROBLEM — B34.2 CORONAVIRUS INFECTION: Status: RESOLVED | Noted: 2022-11-29 | Resolved: 2023-09-20

## 2023-09-20 PROBLEM — L82.1 OTHER SEBORRHEIC KERATOSIS: Status: ACTIVE | Noted: 2018-12-14

## 2023-09-20 PROBLEM — M25.552 LEFT HIP PAIN: Status: RESOLVED | Noted: 2023-03-07 | Resolved: 2023-09-20

## 2023-09-20 PROBLEM — H66.90 OTITIS MEDIA: Status: RESOLVED | Noted: 2023-03-07 | Resolved: 2023-09-20

## 2023-09-20 PROBLEM — L81.4 OTHER MELANIN HYPERPIGMENTATION: Status: ACTIVE | Noted: 2018-12-14

## 2023-09-20 PROBLEM — T46.4X5A ACE-INHIBITOR COUGH: Status: RESOLVED | Noted: 2023-03-07 | Resolved: 2023-09-20

## 2023-09-20 PROBLEM — E66.01 MORBID OBESITY WITH BMI OF 45.0-49.9, ADULT (MULTI): Status: ACTIVE | Noted: 2023-09-20

## 2023-09-20 PROBLEM — R05.8 ACE-INHIBITOR COUGH: Status: RESOLVED | Noted: 2023-03-07 | Resolved: 2023-09-20

## 2023-09-20 PROBLEM — D22.5 MELANOCYTIC NEVI OF TRUNK: Status: ACTIVE | Noted: 2018-12-14

## 2023-09-20 PROBLEM — J20.9 ACUTE BRONCHITIS: Status: RESOLVED | Noted: 2023-03-07 | Resolved: 2023-09-20

## 2023-09-20 PROBLEM — M25.512 LEFT SHOULDER PAIN: Status: RESOLVED | Noted: 2023-03-07 | Resolved: 2023-09-20

## 2023-09-20 PROBLEM — H92.02 LEFT EAR PAIN: Status: RESOLVED | Noted: 2023-03-07 | Resolved: 2023-09-20

## 2023-09-20 PROBLEM — R07.9 CHEST PAIN: Status: RESOLVED | Noted: 2023-03-07 | Resolved: 2023-09-20

## 2023-09-20 PROBLEM — M79.10 MUSCLE PAIN: Status: RESOLVED | Noted: 2023-03-07 | Resolved: 2023-09-20

## 2023-09-20 PROBLEM — J06.9 URI (UPPER RESPIRATORY INFECTION): Status: RESOLVED | Noted: 2023-03-07 | Resolved: 2023-09-20

## 2023-09-20 PROBLEM — R10.30 LOWER ABDOMINAL PAIN: Status: RESOLVED | Noted: 2023-03-07 | Resolved: 2023-09-20

## 2023-09-20 PROBLEM — D22.60 MELANOCYTIC NEVI OF UNSPECIFIED UPPER LIMB, INCLUDING SHOULDER: Status: ACTIVE | Noted: 2018-12-14

## 2023-09-20 PROBLEM — M21.612 BUNION OF LEFT FOOT: Status: ACTIVE | Noted: 2018-03-19

## 2023-09-20 PROBLEM — Z78.0 POSTMENOPAUSE: Status: ACTIVE | Noted: 2023-09-20

## 2023-09-20 PROBLEM — R51.9 HEADACHE: Status: RESOLVED | Noted: 2023-03-07 | Resolved: 2023-09-20

## 2023-09-20 PROBLEM — L70.0 ACNE VULGARIS: Status: ACTIVE | Noted: 2018-12-14

## 2023-09-20 PROBLEM — R05.9 COUGH: Status: RESOLVED | Noted: 2023-03-07 | Resolved: 2023-09-20

## 2023-09-20 PROBLEM — R07.9 CHEST PAIN, EXERTIONAL: Status: RESOLVED | Noted: 2023-03-07 | Resolved: 2023-09-20

## 2023-09-20 PROBLEM — D18.01 HEMANGIOMA OF SKIN AND SUBCUTANEOUS TISSUE: Status: ACTIVE | Noted: 2018-12-14

## 2023-09-20 PROBLEM — Z04.9 CONDITION NOT FOUND: Status: ACTIVE | Noted: 2023-09-20

## 2023-09-20 PROBLEM — R73.9 BLOOD GLUCOSE ELEVATED: Status: ACTIVE | Noted: 2018-11-07

## 2023-09-20 PROBLEM — L91.8 OTHER HYPERTROPHIC DISORDERS OF THE SKIN: Status: ACTIVE | Noted: 2018-12-14

## 2023-09-20 PROBLEM — L82.1 SEBORRHEIC KERATOSIS: Status: ACTIVE | Noted: 2018-12-14

## 2023-09-20 PROBLEM — J01.90 ACUTE SINUSITIS: Status: RESOLVED | Noted: 2023-03-07 | Resolved: 2023-09-20

## 2023-09-20 PROBLEM — R21 RASH AND OTHER NONSPECIFIC SKIN ERUPTION: Status: ACTIVE | Noted: 2018-12-14

## 2023-09-20 PROBLEM — Z98.1 HISTORY OF FUSION OF CERVICAL SPINE: Status: ACTIVE | Noted: 2023-09-20

## 2023-09-20 RX ORDER — NIRMATRELVIR AND RITONAVIR 300-100 MG
2 KIT ORAL 2 TIMES DAILY
COMMUNITY
End: 2023-11-07 | Stop reason: ALTCHOICE

## 2023-09-20 RX ORDER — TRETINOIN 0.5 MG/G
1 CREAM TOPICAL
COMMUNITY
Start: 2016-11-22

## 2023-09-20 RX ORDER — LOSARTAN POTASSIUM 50 MG/1
50 TABLET ORAL DAILY
COMMUNITY
End: 2024-05-03 | Stop reason: WASHOUT

## 2023-09-20 RX ORDER — LANCETS 26 GAUGE
1 EACH MISCELLANEOUS AS NEEDED
COMMUNITY

## 2023-09-20 RX ORDER — MUPIROCIN 20 MG/G
1 OINTMENT TOPICAL
COMMUNITY
Start: 2016-11-22

## 2023-09-20 RX ORDER — CLINDAMYCIN PHOSPHATE 10 MG/G
1 GEL TOPICAL
COMMUNITY
Start: 2015-05-26

## 2023-09-20 RX ORDER — DOXYCYCLINE 100 MG/1
100 CAPSULE ORAL
COMMUNITY
Start: 2016-11-22

## 2023-09-20 RX ORDER — SEMAGLUTIDE 1.34 MG/ML
1 INJECTION, SOLUTION SUBCUTANEOUS
COMMUNITY
End: 2023-11-07

## 2023-10-02 DIAGNOSIS — K58.9 IRRITABLE BOWEL SYNDROME, UNSPECIFIED TYPE: Primary | ICD-10-CM

## 2023-10-02 DIAGNOSIS — E03.9 ACQUIRED HYPOTHYROIDISM: ICD-10-CM

## 2023-10-02 RX ORDER — DICYCLOMINE HYDROCHLORIDE 10 MG/1
10 CAPSULE ORAL 3 TIMES DAILY
Qty: 90 CAPSULE | Refills: 1 | Status: SHIPPED | OUTPATIENT
Start: 2023-10-02

## 2023-10-02 RX ORDER — LEVOTHYROXINE SODIUM 137 UG/1
137 TABLET ORAL DAILY
Qty: 90 TABLET | Refills: 1 | Status: SHIPPED | OUTPATIENT
Start: 2023-10-02 | End: 2024-04-12 | Stop reason: SDUPTHER

## 2023-10-02 NOTE — TELEPHONE ENCOUNTER
Pt is requesting a refill on    Levothyroxine 137 mg 1 po every day  LR 03/16/2023  # 90 x 1    Dicyclomine 10 mg 1 po every day  ( This was filled last in 2018 by you)      [phone]

## 2023-10-20 ENCOUNTER — OFFICE VISIT (OUTPATIENT)
Dept: NEUROLOGY | Facility: CLINIC | Age: 68
End: 2023-10-20
Payer: MEDICARE

## 2023-10-20 VITALS
WEIGHT: 255 LBS | DIASTOLIC BLOOD PRESSURE: 76 MMHG | RESPIRATION RATE: 16 BRPM | SYSTOLIC BLOOD PRESSURE: 142 MMHG | BODY MASS INDEX: 43.54 KG/M2 | HEIGHT: 64 IN | HEART RATE: 82 BPM

## 2023-10-20 DIAGNOSIS — G44.40 MEDICATION OVERUSE HEADACHE: ICD-10-CM

## 2023-10-20 DIAGNOSIS — G43.709 CHRONIC MIGRAINE W/O AURA W/O STATUS MIGRAINOSUS, NOT INTRACTABLE: Primary | ICD-10-CM

## 2023-10-20 PROCEDURE — 1159F MED LIST DOCD IN RCRD: CPT | Performed by: STUDENT IN AN ORGANIZED HEALTH CARE EDUCATION/TRAINING PROGRAM

## 2023-10-20 PROCEDURE — 3078F DIAST BP <80 MM HG: CPT | Performed by: STUDENT IN AN ORGANIZED HEALTH CARE EDUCATION/TRAINING PROGRAM

## 2023-10-20 PROCEDURE — 1126F AMNT PAIN NOTED NONE PRSNT: CPT | Performed by: STUDENT IN AN ORGANIZED HEALTH CARE EDUCATION/TRAINING PROGRAM

## 2023-10-20 PROCEDURE — 4010F ACE/ARB THERAPY RXD/TAKEN: CPT | Performed by: STUDENT IN AN ORGANIZED HEALTH CARE EDUCATION/TRAINING PROGRAM

## 2023-10-20 PROCEDURE — 1036F TOBACCO NON-USER: CPT | Performed by: STUDENT IN AN ORGANIZED HEALTH CARE EDUCATION/TRAINING PROGRAM

## 2023-10-20 PROCEDURE — 3044F HG A1C LEVEL LT 7.0%: CPT | Performed by: STUDENT IN AN ORGANIZED HEALTH CARE EDUCATION/TRAINING PROGRAM

## 2023-10-20 PROCEDURE — 1160F RVW MEDS BY RX/DR IN RCRD: CPT | Performed by: STUDENT IN AN ORGANIZED HEALTH CARE EDUCATION/TRAINING PROGRAM

## 2023-10-20 PROCEDURE — 3077F SYST BP >= 140 MM HG: CPT | Performed by: STUDENT IN AN ORGANIZED HEALTH CARE EDUCATION/TRAINING PROGRAM

## 2023-10-20 PROCEDURE — 3008F BODY MASS INDEX DOCD: CPT | Performed by: STUDENT IN AN ORGANIZED HEALTH CARE EDUCATION/TRAINING PROGRAM

## 2023-10-20 PROCEDURE — 99205 OFFICE O/P NEW HI 60 MIN: CPT | Performed by: STUDENT IN AN ORGANIZED HEALTH CARE EDUCATION/TRAINING PROGRAM

## 2023-10-20 ASSESSMENT — ENCOUNTER SYMPTOMS
DEPRESSION: 0
LOSS OF SENSATION IN FEET: 1

## 2023-10-20 ASSESSMENT — PAIN SCALES - GENERAL: PAINLEVEL: 0-NO PAIN

## 2023-10-20 NOTE — PROGRESS NOTES
Subjective     Chief Complaint: Headache    Mary Ellen Gomez is a 68 y.o. year old female who presents with chief complaint of headaches.    Patient describes feeling of of shocking sensation starting on 7/4/2023. Pain was L fronto-temporal. This continued for 15 minutes. Called EMS. Resolved by the time she got to hospital. No photophobia, phonophobia, N/V. No autonomic symptoms. Pain was a 7/10 in severity. Was thought to have trigeminal neuralgia, and started on carbamazepine. Has been taking this 1 time per day 100mg. Denies any side effects to it.     Two weeks prior to this, was having similar situations lasting 3 seconds or less. This would 1 time per day, 2-3 times per week.     Mary Ellen started getting headaches in teenage years. Was having 1/30 HA days per month, but in the last few years, headaches increased gradually to 20/30 HA days per month. The headaches are usually dull and throbbing and are located frontal, generally symmetric. The patient rates her most severe headaches a 6 in intensity. Headaches last several hours unless she takes excedrin which resolves headaches within 20 minutes. Associated nausea, photophobia, and phonophobia. Headaches are worsened with exertion. Triggers include barometric pressure  and stress.    No aura.    Headaches tend to be first thing in the morning. Headaches will resolve when she eats a piece of fruit. Notes that headaches seemed to have increased with diabetes diagnosis.     Current Acute Headache Treatment None   Current Preventative Headache Treatment None   Previous Acute Headache Treatment  None   Previous Preventative Headache Treatment None       ROS: As per HPI, otherwise all other systems have been reviewed are negative for complaint.     Past Medical History:   Diagnosis Date    Calculus of kidney     Bilateral kidney stones    Encounter for immunization 06/20/2017    Need for vaccination    Encounter for screening for malignant neoplasm, site unspecified      "Encounter for Pap smear    Other conditions influencing health status     Normal colonoscopy    Other specified viral diseases     [place] fever    Pain in left shoulder 03/10/2022    Left shoulder pain    Personal history of malignant neoplasm of cervix uteri 11/15/2018    History of cervical cancer    Personal history of other medical treatment     H/O mammogram     Past Surgical History:   Procedure Laterality Date    FOOT SURGERY      NECK SURGERY       Family History   Problem Relation Name Age of Onset    Diabetes Mother      Hypertension Mother      Colon cancer Father      Diabetes Father      Hypertension Father       Social History     Tobacco Use    Smoking status: Former     Packs/day: 0.25     Years: 20.00     Additional pack years: 0.00     Total pack years: 5.00     Types: Cigarettes     Passive exposure: Past    Smokeless tobacco: Never    Tobacco comments:     Quit at age 32.   Substance Use Topics    Alcohol use: Not on file        Objective   /76   Pulse 82   Resp 16   Ht 1.626 m (5' 4\")   Wt 116 kg (255 lb)   BMI 43.77 kg/m²     Neuro Exam:  Cardiac Exam: No apparent edema of b/l lower extremities  Neurological Exam:  MENTAL STATUS:   General Appearance: No distress, alert, interactive, and cooperative. Orientation was normal to time, place and person. Recent and remote memory was intact.     OPHTHALMOSCOPIC:   The ophthalmoscopic exam was normal. The fundi were well visualized with normal disc margins, clear vessels and vascular pulsations. No disc edema. The cup/disk ratio was not enlarged. No hemorrhages or exudates were present in the posterior segments that were visualized.     CRANIAL NERVES:   CN 2         Visual fields full to confrontation.   CN 3, 4, 6   Pupils round, 4 mm in diameter, equally reactive to light. Lids symmetric; no ptosis. EOMs normal alignment, full range with normal saccades, pursuit and convergence.   No nystagmus.   CN 5   Facial sensation intact " "bilaterally.   CN 7   Normal and symmetric facial strength. Nasolabial folds symmetric.   CN 8   Hearing intact to conversation and finger rub.  CN 9, 10   Palate elevates symmetrically.  CN 11   Normal strength of shoulder shrug and neck turning.   CN 12   Tongue midline, with normal bulk and strength; no fasciculations.     MOTOR:   Muscle bulk and tone were normal in both upper and lower extremities.   No pronator drift bilaterally.  No fasciculations, tremor or other abnormal movements evident with the patient examined clothed.    STRENGTH:  R  L  Deltoid            5          5  Biceps  5 5  Triceps  5 5    Hip flexion 5 5  Knee Flex 5 5  Knee Ex 5 5    REFLEXES: R L  Biceps  2 2                     Triceps  2 2  Patellar  2 2     SENSORY:   In both upper and lower extremities, sensation was intact to light touch.    COORDINATION:   In both upper extremities, finger-nose-finger was intact without dysmetria or overshoot.     GAIT:   Station was stable with a normal base. Gait was stable with a normal arm swing and speed. No ataxia, shuffling, steppage or waddling was present. No circumduction was present. No Romberg sign was present. Unstable with tandem gait.    Assessment/Plan   Given the description and frequency of headaches, likely that patient has chronic migraine without aura, with \"jabs and jolts\" phenomena. MRI brain personally reviewed with patient, and I do see the vessel abutment of trigeminal nerves, however symptomatology not consistent with trigeminal neuralgia. Will discontinue carbamazepine at this time. Suspect that hypoglycemia is contributing to headaches as headaches are primarily in the morning and resolve with eating a piece of fruit and because headache frequency worsened with diagnosis of DM. Encouraged patient to test blood glucose when she has headaches for additional data, and aim for tighter glycemic control. Additionally, will stop excedrin use as she likely has MOH at this time. " Per our discussion, patient would like to try conservative measures first before starting a migraine preventive. If we do start a migraine ppx, would consider topiramate.     - discontinue carbamazepine  - tight glycemic control  - stop Excedrin  - follow up in 3 months    I personally spent 61 minutes today, exclusive of procedures, providing care for this patient, including preparation, face to face time, documentation and other services such as review of medical records, diagnostic result, patient education, counseling, coordination of care as specified in the encounter.

## 2023-10-20 NOTE — PATIENT INSTRUCTIONS
Given the description and frequency of your headaches, you likely have chronic migraine without aura. Given the description of your headaches, I suspect that it is due to low blood sugar in the morning. Per our discussion, you will start checking your blood sugar when you have a headache and aiming for better blood sugar control. We discussed trying these conservative measures first before starting a medication. I do not suspect that you have trigeminal neuralgia, but rather that you have primary stabbing headache (jabs and jolts phenomena). Will follow up in 3 months.

## 2023-11-07 ENCOUNTER — OFFICE VISIT (OUTPATIENT)
Dept: PRIMARY CARE | Facility: CLINIC | Age: 68
End: 2023-11-07
Payer: MEDICARE

## 2023-11-07 VITALS
WEIGHT: 264 LBS | BODY MASS INDEX: 45.32 KG/M2 | DIASTOLIC BLOOD PRESSURE: 80 MMHG | TEMPERATURE: 97.1 F | SYSTOLIC BLOOD PRESSURE: 118 MMHG

## 2023-11-07 DIAGNOSIS — M25.551 RIGHT HIP PAIN: ICD-10-CM

## 2023-11-07 DIAGNOSIS — M54.50 RIGHT-SIDED LOW BACK PAIN WITHOUT SCIATICA, UNSPECIFIED CHRONICITY: Primary | ICD-10-CM

## 2023-11-07 PROCEDURE — 1126F AMNT PAIN NOTED NONE PRSNT: CPT | Performed by: FAMILY MEDICINE

## 2023-11-07 PROCEDURE — 3008F BODY MASS INDEX DOCD: CPT | Performed by: FAMILY MEDICINE

## 2023-11-07 PROCEDURE — 3079F DIAST BP 80-89 MM HG: CPT | Performed by: FAMILY MEDICINE

## 2023-11-07 PROCEDURE — 4010F ACE/ARB THERAPY RXD/TAKEN: CPT | Performed by: FAMILY MEDICINE

## 2023-11-07 PROCEDURE — 3074F SYST BP LT 130 MM HG: CPT | Performed by: FAMILY MEDICINE

## 2023-11-07 PROCEDURE — 99213 OFFICE O/P EST LOW 20 MIN: CPT | Performed by: FAMILY MEDICINE

## 2023-11-07 PROCEDURE — 1036F TOBACCO NON-USER: CPT | Performed by: FAMILY MEDICINE

## 2023-11-07 PROCEDURE — 1160F RVW MEDS BY RX/DR IN RCRD: CPT | Performed by: FAMILY MEDICINE

## 2023-11-07 PROCEDURE — 3044F HG A1C LEVEL LT 7.0%: CPT | Performed by: FAMILY MEDICINE

## 2023-11-07 PROCEDURE — 1159F MED LIST DOCD IN RCRD: CPT | Performed by: FAMILY MEDICINE

## 2023-11-07 RX ORDER — CYCLOBENZAPRINE HCL 10 MG
10 TABLET ORAL 3 TIMES DAILY PRN
Qty: 30 TABLET | Refills: 0 | Status: SHIPPED | OUTPATIENT
Start: 2023-11-07 | End: 2024-01-06

## 2023-11-07 RX ORDER — METHYLPREDNISOLONE 4 MG/1
TABLET ORAL
Qty: 21 TABLET | Refills: 0 | Status: SHIPPED | OUTPATIENT
Start: 2023-11-07 | End: 2023-11-14

## 2023-11-07 ASSESSMENT — PATIENT HEALTH QUESTIONNAIRE - PHQ9
SUM OF ALL RESPONSES TO PHQ9 QUESTIONS 1 AND 2: 0
1. LITTLE INTEREST OR PLEASURE IN DOING THINGS: NOT AT ALL
2. FEELING DOWN, DEPRESSED OR HOPELESS: NOT AT ALL

## 2023-11-07 ASSESSMENT — ENCOUNTER SYMPTOMS: DEPRESSION: 0

## 2023-11-07 NOTE — PROGRESS NOTES
"Subjective   Patient ID: [id number]     Mary Ellen Gomez is a 68 y.o. female who presents for Back Pain (Near spine) and Hip Pain (Left hip).  HPI  She complains of back pain and left hip pain.  She has had this for about a month.  She denies any type of known injury.  No unusual activity precipitated this.  No numbness or tingling in the legs except diabetic neuropathy in the feet.      No migraines or \"head zapping\" sensations lately.  She follows up with neurology in January.     She remains on gabapentin.          Objective     /80 (BP Location: Left arm, Patient Position: Sitting)   Temp 36.2 °C (97.1 °F) (Skin)   Wt 120 kg (264 lb)   BMI 45.32 kg/m²      Physical Exam  Constitutional:       General: She is not in acute distress.     Appearance: She is not toxic-appearing.   Musculoskeletal:      Comments: Tender at lateral gluteal muscles.  Tender at lumbar paraspinals.  SLR neg.  DTRs 1/4 bilaterally in the patellar and Achilles tendons.   Neurological:      Mental Status: She is alert.         Assessment/Plan   Problem List Items Addressed This Visit       Lumbago - Primary    Relevant Medications    methylPREDNISolone (Medrol Dospak) 4 mg tablets    cyclobenzaprine (Flexeril) 10 mg tablet    Other Relevant Orders    Referral to Physical Therapy     Other Visit Diagnoses       Right hip pain        Relevant Medications    methylPREDNISolone (Medrol Dospak) 4 mg tablets    cyclobenzaprine (Flexeril) 10 mg tablet    Other Relevant Orders    Referral to Physical Therapy        I will order physical therapy.  Steroids and muscle relaxants were prescribed.  Weight loss would help with this problem.  You will see a nutritionist as already scheduled.     Ponce Amos, DO   "

## 2023-11-07 NOTE — PATIENT INSTRUCTIONS
I will order physical therapy.  Steroids and muscle relaxants were prescribed.  Weight loss would help with this problem.  You will see a nutritionist as already scheduled.

## 2023-11-20 DIAGNOSIS — E11.69 TYPE 2 DIABETES MELLITUS WITH OTHER SPECIFIED COMPLICATION, WITHOUT LONG-TERM CURRENT USE OF INSULIN (MULTI): ICD-10-CM

## 2023-11-20 RX ORDER — ATORVASTATIN CALCIUM 40 MG/1
40 TABLET, FILM COATED ORAL DAILY
Qty: 90 TABLET | Refills: 0 | Status: SHIPPED | OUTPATIENT
Start: 2023-11-20 | End: 2024-02-28 | Stop reason: SDUPTHER

## 2023-11-20 NOTE — TELEPHONE ENCOUNTER
Pt requesting refill  Atorvastatin 40mg daily  [phone]  Last refill 8/28/23  Last appt 11/7/23  No future appt has been scheduled

## 2023-11-27 DIAGNOSIS — I10 BENIGN ESSENTIAL HYPERTENSION: ICD-10-CM

## 2023-11-27 NOTE — TELEPHONE ENCOUNTER
Pt requesting refill    Losartan 100mg daily   [phone]  Last refill 5/25/23  Last appt 11/7/23  No future appt has been scheduled

## 2023-11-28 RX ORDER — LOSARTAN POTASSIUM 100 MG/1
100 TABLET ORAL DAILY
Qty: 90 TABLET | Refills: 1 | Status: SHIPPED | OUTPATIENT
Start: 2023-11-28

## 2023-12-04 ENCOUNTER — EVALUATION (OUTPATIENT)
Dept: PHYSICAL THERAPY | Facility: CLINIC | Age: 68
End: 2023-12-04
Payer: MEDICARE

## 2023-12-04 DIAGNOSIS — M25.551 PAIN OF RIGHT HIP: Primary | ICD-10-CM

## 2023-12-04 DIAGNOSIS — M54.50 LUMBAGO: ICD-10-CM

## 2023-12-04 DIAGNOSIS — M10.9 GOUT, UNSPECIFIED CAUSE, UNSPECIFIED CHRONICITY, UNSPECIFIED SITE: ICD-10-CM

## 2023-12-04 DIAGNOSIS — E11.69 TYPE 2 DIABETES MELLITUS WITH OTHER SPECIFIED COMPLICATION, WITHOUT LONG-TERM CURRENT USE OF INSULIN (MULTI): Primary | ICD-10-CM

## 2023-12-04 DIAGNOSIS — M25.551 RIGHT HIP PAIN: ICD-10-CM

## 2023-12-04 DIAGNOSIS — M54.50 RIGHT-SIDED LOW BACK PAIN WITHOUT SCIATICA, UNSPECIFIED CHRONICITY: ICD-10-CM

## 2023-12-04 PROCEDURE — 97110 THERAPEUTIC EXERCISES: CPT | Mod: GP | Performed by: PHYSICAL THERAPIST

## 2023-12-04 PROCEDURE — 97161 PT EVAL LOW COMPLEX 20 MIN: CPT | Mod: GP | Performed by: PHYSICAL THERAPIST

## 2023-12-04 RX ORDER — ALLOPURINOL 300 MG/1
300 TABLET ORAL DAILY
Qty: 90 TABLET | Refills: 0 | Status: SHIPPED | OUTPATIENT
Start: 2023-12-04 | End: 2024-03-05 | Stop reason: SDUPTHER

## 2023-12-04 RX ORDER — PIOGLITAZONEHYDROCHLORIDE 30 MG/1
30 TABLET ORAL DAILY
COMMUNITY
End: 2023-12-04 | Stop reason: SDUPTHER

## 2023-12-04 RX ORDER — PIOGLITAZONEHYDROCHLORIDE 30 MG/1
30 TABLET ORAL DAILY
Qty: 90 TABLET | Refills: 0 | Status: SHIPPED | OUTPATIENT
Start: 2023-12-04 | End: 2024-03-14 | Stop reason: SDUPTHER

## 2023-12-04 ASSESSMENT — PAIN - FUNCTIONAL ASSESSMENT: PAIN_FUNCTIONAL_ASSESSMENT: 0-10

## 2023-12-04 ASSESSMENT — PAIN SCALES - GENERAL: PAINLEVEL_OUTOF10: 5 - MODERATE PAIN

## 2023-12-04 NOTE — LETTER
December 4, 2023    Waldo Moctezuma, PT  [address]  [facility] [address]    Patient: Mary Ellen Gomez   YOB: 1955   Date of Visit: 12/4/2023       Dear Ponce Amos Do  [address]  [place] [address]    The attached plan of care is being sent to you because your patient’s medical reimbursement requires that you certify the plan of care. Your signature is required to allow uninterrupted insurance coverage.      You may indicate your approval by signing below and faxing this form back to us at Dept Fax: [phone].    Please call Dept: [phone] with any questions or concerns.    Thank you for this referral,        Waldo Moctezuma, PT  [facility] [address] [facility] [facility]  [address] [facility] [address]    Payer: Payor: MEDICARE / Plan: MEDICARE PART A AND B / Product Type: *No Product type* /                                                                         Date:     Dear Waldo Moctezuma, PT,     Re: Ms. Mary Ellen Gomez, MRN:[id number]    I certify that I have reviewed the attached plan of care and it is medically necessary for Ms. Mary Ellen Gomez (1955) who is under my care.          ______________________________________                    _________________  Provider name and credentials                                           Date and time                                                                                           Plan of Care 12/4/23   Effective from: 12/4/2023  Effective to: 2/20/2024    Plan ID: [id number]            Participants as of Finalize on 12/4/2023    Name Type Comments Contact Info    Ponce Amos DO PCP - MSSP ACO Attributed Provider  [phone]    Waldo Moctezuma PT Physical Therapist  [phone]       Last Plan Note     Author: Waldo Moctezuma PT Status: Incomplete Last edited: 12/4/2023  4:30 PM       Physical Therapy Evaluation    Mary Ellen Gomez  [id number]  1955  @date@    Visit #: 1.  Visits  approved: Medicare.  Certification dates: 12/4/23-2/20/24.        Subjective/History  DOI: 9/25/23.  Mechanism of injury: Insidious.  Area of symptoms: Intermittent dull ache at central low back and left lat hip. Also has dull ache at right ant hip.  Aggravating factors: Walk 1/4 mile. Stand 5 min. In/out of car. Up/down stairs.  Easing factors: Heat.  Special questions/Red flags: DM.  Occupation: ..  Recreation/Hobbies/Sports: Walk on treadmill ~20 min as radha.  Living situation: 3 flights of stairs to enter home- Does safely with rail.  Barriers to treatment: None.  Preferred language: English.    Objective Findings  Observation/gait: Mod excessive low LS lordosis. Gait: mod bilat comensated Trendelenburg with mild wide INDER.  AROM: LS flex: wnl. Ext: ~30- left LBP. SB: wnl bilat- pain with left SB.  PROM: Hip flex: wnl- pain right. IR(90): left ~20.  Right ~10.  Muscle activation/Resisted testing: TA activation: Fair/poor. Resisted hip flex: wnl bilat.  Special tests: SLR: ~70 deg bilat- HS stretch. No change with IR/DF. FADIR: positive right.  Palpation: TTP right troch bursa.  IV- LS PA: Local pain and jt stiffness LPA L4-5.    Treatment: Therex: Pelvic tilts, hooklying trunk rot- each to HEP 1 min, 3x/day. TA hold- HEP throughout day.    Assessment  Patient presents with decr ROM and local pain with jt stiffness consistent with L4-5 dysfunction. Decr ROM, positive FADIR and palpation findings consistent with right hip OA and left hip troch bursitis.    Plan  Physical therapy 1-2 times/week for 6-8 wks. Therapy may include the following: Therapeutic exercise, manual therapy, education/home program.     Goals: Walk 1/2 mile for exer w/o pain. Stand 15 min for meal prep w/o pain. In/out of car w/o pain. Up/down stairs for household ambulation w/o pain.          Current Participants as of 12/4/2023    Name Type Comments Contact Info    Ponce Amos DO PCP - MSSP ACO Attributed Provider   [phone]    Signature pending    Waldo Moctezuma PT Physical Therapist  [phone]    Signature pending

## 2023-12-04 NOTE — PROGRESS NOTES
Physical Therapy Evaluation    Mary Ellen Gomez  [id number]  1955  @date@    Visit #: 1.  Visits approved: Medicare.  Certification dates: 12/4/23-2/20/24.        Subjective/History  DOI: 9/25/23.  Mechanism of injury: Insidious.  Area of symptoms: Intermittent dull ache at central low back and left lat hip. Also has dull ache at right ant hip.  Aggravating factors: Walk 1/4 mile. Stand 5 min. In/out of car. Up/down stairs.  Easing factors: Heat.  Special questions/Red flags: DM.  Occupation: ..  Recreation/Hobbies/Sports: Walk on treadmill ~20 min as radha.  Living situation: 3 flights of stairs to enter home- Does safely with rail.  Barriers to treatment: None.  Preferred language: English.    Objective Findings  Observation/gait: Mod excessive low LS lordosis. Gait: mod bilat comensated Trendelenburg with mild wide INDER.  AROM: LS flex: wnl. Ext: ~30- left LBP. SB: wnl bilat- pain with left SB.  PROM: Hip flex: wnl- pain right. IR(90): left ~20.  Right ~10.  Muscle activation/Resisted testing: TA activation: Fair/poor. Resisted hip flex: wnl bilat.  Special tests: SLR: ~70 deg bilat- HS stretch. No change with IR/DF. FADIR: positive right.  Palpation: TTP right troch bursa.  IV- LS PA: Local pain and jt stiffness LPA L4-5.    Treatment: Therex: Pelvic tilts, hooklying trunk rot- each to HEP 1 min, 3x/day. TA hold- HEP throughout day.    Assessment  Patient presents with decr ROM and local pain with jt stiffness consistent with L4-5 dysfunction. Decr ROM, positive FADIR and palpation findings consistent with right hip OA and left hip troch bursitis.    Plan  Physical therapy 1-2 times/week for 6-8 wks. Therapy may include the following: Therapeutic exercise, manual therapy, education/home program.     Goals: Walk 1/2 mile for exer w/o pain. Stand 15 min for meal prep w/o pain. In/out of car w/o pain. Up/down stairs for household ambulation w/o pain.

## 2023-12-12 ENCOUNTER — APPOINTMENT (OUTPATIENT)
Dept: PHYSICAL THERAPY | Facility: CLINIC | Age: 68
End: 2023-12-12
Payer: MEDICARE

## 2023-12-19 ENCOUNTER — TREATMENT (OUTPATIENT)
Dept: PHYSICAL THERAPY | Facility: CLINIC | Age: 68
End: 2023-12-19
Payer: MEDICARE

## 2023-12-19 DIAGNOSIS — M54.50 LUMBAGO: ICD-10-CM

## 2023-12-19 DIAGNOSIS — M54.50 RIGHT-SIDED LOW BACK PAIN WITHOUT SCIATICA, UNSPECIFIED CHRONICITY: ICD-10-CM

## 2023-12-19 PROCEDURE — 97110 THERAPEUTIC EXERCISES: CPT | Mod: GP | Performed by: PHYSICAL THERAPIST

## 2023-12-19 PROCEDURE — 97140 MANUAL THERAPY 1/> REGIONS: CPT | Mod: GP | Performed by: PHYSICAL THERAPIST

## 2023-12-19 NOTE — PROGRESS NOTES
Physical Therapy Follow-up    Patient Name: Mary Ellen Gomez  MRN: [id number]  Today's Date: 12/19/2023         Visit#: 2. Medicare approved.  Cert dates: 12/4/23-2/20/24    Precautions: None.    Subjective: 5/10 resting pain today. All function ~ same. HEP going well.    Objective: AROM LS flex: wnl. Ext: wnl. SB: wnl bilat- pain with left SB.    Treatment:   Therapeutic exercise: Hooklying trunk rot, pelvic tilts- demo'd correctly.  Bent knee fall-outs- HEP 1 min, 2x/day.  2-leg bridge- Hep 10x, 2x/day.   Manual therapy: IV- LPA L4-5// SB: wnl- decr pain.    Assessment: Decr sxs with rx. Demo's HEP correctly. TA activation slightly better with cuing.    Plan: Incr core stabilization exer.

## 2023-12-22 DIAGNOSIS — E11.42 DIABETIC POLYNEUROPATHY ASSOCIATED WITH TYPE 2 DIABETES MELLITUS (MULTI): Primary | ICD-10-CM

## 2023-12-22 DIAGNOSIS — E11.69 TYPE 2 DIABETES MELLITUS WITH OTHER SPECIFIED COMPLICATION, WITHOUT LONG-TERM CURRENT USE OF INSULIN (MULTI): ICD-10-CM

## 2023-12-22 RX ORDER — GABAPENTIN 300 MG/1
300 CAPSULE ORAL NIGHTLY
Qty: 90 CAPSULE | Refills: 0 | Status: SHIPPED | OUTPATIENT
Start: 2023-12-22 | End: 2024-05-10 | Stop reason: SDUPTHER

## 2023-12-22 RX ORDER — GLIPIZIDE 10 MG/1
10 TABLET, FILM COATED, EXTENDED RELEASE ORAL 2 TIMES DAILY
Qty: 180 TABLET | Refills: 0 | Status: SHIPPED | OUTPATIENT
Start: 2023-12-22 | End: 2024-04-09 | Stop reason: SDUPTHER

## 2023-12-22 NOTE — TELEPHONE ENCOUNTER
Refill request     Med name-glipizide xl  Med dose-10mg  Med directions-take1 tablet twice a day  LR-09/11/23    Med name-gabapentin  Med dose-300mg  Med directions-take 1 capsule at night  LR-01/20/23    Pharmacy-[facility]  Pharmacy address-[facility]    LV-11/07/23  NV-offered to schedule next visit pt refused

## 2023-12-29 ENCOUNTER — TREATMENT (OUTPATIENT)
Dept: PHYSICAL THERAPY | Facility: CLINIC | Age: 68
End: 2023-12-29
Payer: MEDICARE

## 2023-12-29 DIAGNOSIS — M54.50 RIGHT-SIDED LOW BACK PAIN WITHOUT SCIATICA, UNSPECIFIED CHRONICITY: ICD-10-CM

## 2023-12-29 DIAGNOSIS — M54.50 LUMBAGO: ICD-10-CM

## 2023-12-29 PROCEDURE — 97110 THERAPEUTIC EXERCISES: CPT | Mod: GP | Performed by: PHYSICAL THERAPIST

## 2023-12-29 PROCEDURE — 97140 MANUAL THERAPY 1/> REGIONS: CPT | Mod: GP | Performed by: PHYSICAL THERAPIST

## 2023-12-29 NOTE — PROGRESS NOTES
Physical Therapy Follow-up    Patient Name: Mary Ellen Gomez  MRN: [id number]  Today's Date: 12/29/2023         Visit#: 3. Medicare approved.  Cert dates: 12/4/23-2/20/24    Precautions: None.    Subjective: 3/10 resting pain today. Easier to walk. Last rx helpful. HEP going well, but inconsistent.    Objective: AROM LS flex: wnl. Ext: wnl. SB: wnl bilat- pain with left SB.    Treatment:   Therapeutic exercise: Hooklying trunk rot, pelvic tilts, bent knee fall-outs, 2-leg bridge- demo'd correctly.   Manual therapy: IV- LPA L4-5// SB: wnl- decr pain.    Assessment: Decr sxs with rx. Demo's HEP correctly with min cuing. TA activation slightly better with cuing.    Plan: Incr core stabilization exer.

## 2024-01-30 ENCOUNTER — APPOINTMENT (OUTPATIENT)
Dept: PHYSICAL THERAPY | Facility: CLINIC | Age: 69
End: 2024-01-30
Payer: MEDICARE

## 2024-01-30 ENCOUNTER — APPOINTMENT (OUTPATIENT)
Dept: NEUROLOGY | Facility: CLINIC | Age: 69
End: 2024-01-30
Payer: MEDICARE

## 2024-02-06 ENCOUNTER — APPOINTMENT (OUTPATIENT)
Dept: PHYSICAL THERAPY | Facility: CLINIC | Age: 69
End: 2024-02-06
Payer: MEDICARE

## 2024-02-28 ENCOUNTER — HOSPITAL ENCOUNTER (OUTPATIENT)
Dept: RADIOLOGY | Facility: HOSPITAL | Age: 69
Discharge: HOME | End: 2024-02-28
Payer: MEDICARE

## 2024-02-28 ENCOUNTER — TELEPHONE (OUTPATIENT)
Dept: PRIMARY CARE | Facility: CLINIC | Age: 69
End: 2024-02-28
Payer: MEDICARE

## 2024-02-28 DIAGNOSIS — E11.69 TYPE 2 DIABETES MELLITUS WITH OTHER SPECIFIED COMPLICATION, WITHOUT LONG-TERM CURRENT USE OF INSULIN (MULTI): ICD-10-CM

## 2024-02-28 DIAGNOSIS — R13.10 DYSPHAGIA: ICD-10-CM

## 2024-02-28 PROCEDURE — 74221 X-RAY XM ESOPHAGUS 2CNTRST: CPT | Performed by: RADIOLOGY

## 2024-02-28 PROCEDURE — 2500000001 HC RX 250 WO HCPCS SELF ADMINISTERED DRUGS (ALT 637 FOR MEDICARE OP)

## 2024-02-28 PROCEDURE — 74220 X-RAY XM ESOPHAGUS 1CNTRST: CPT

## 2024-02-28 PROCEDURE — 3430000001 HC RX 343 DIAGNOSTIC RADIOPHARMACEUTICALS

## 2024-02-28 PROCEDURE — A9698 NON-RAD CONTRAST MATERIALNOC: HCPCS

## 2024-02-28 RX ORDER — ATORVASTATIN CALCIUM 40 MG/1
40 TABLET, FILM COATED ORAL DAILY
Qty: 90 TABLET | Refills: 0 | Status: SHIPPED | OUTPATIENT
Start: 2024-02-28 | End: 2024-06-05 | Stop reason: SDUPTHER

## 2024-02-28 RX ADMIN — BARIUM SULFATE 100 ML: 960 POWDER, FOR SUSPENSION ORAL at 14:52

## 2024-02-28 RX ADMIN — BARIUM SULFATE 75 ML: 980 POWDER, FOR SUSPENSION ORAL at 14:53

## 2024-02-28 NOTE — TELEPHONE ENCOUNTER
Pt is calling in regards to her colonoscopy that she has scheduled for next Wednesday 3/6/24. Pt stated that they told her to call and ask which medications she shouldn't take or stop prior to her colonoscopy? Pt is asking if someone can give her to let her know?     Pt also stated that she is having a Esophagram today at [facility] and she just wanted to let you know.

## 2024-02-28 NOTE — TELEPHONE ENCOUNTER
Refill Atorvastatin 40mg 1 every day  Allopurinol 300mg 1 every day    [facility]     LR: 11/20/23  LV: 11/07/23  No future appt

## 2024-02-29 NOTE — TELEPHONE ENCOUNTER
Ponce Amos, DO to [facility] [place] [address] Clinical Support Staff    She can continue all of her medication except: stop the pioglitazone and the glipizide for the day before and the day of the colonoscopy.

## 2024-03-05 ENCOUNTER — TELEPHONE (OUTPATIENT)
Dept: PRIMARY CARE | Facility: CLINIC | Age: 69
End: 2024-03-05
Payer: MEDICARE

## 2024-03-05 DIAGNOSIS — M10.9 GOUT, UNSPECIFIED CAUSE, UNSPECIFIED CHRONICITY, UNSPECIFIED SITE: ICD-10-CM

## 2024-03-05 RX ORDER — ALLOPURINOL 300 MG/1
300 TABLET ORAL DAILY
Qty: 90 TABLET | Refills: 0 | Status: SHIPPED | OUTPATIENT
Start: 2024-03-05 | End: 2024-06-05 | Stop reason: SDUPTHER

## 2024-03-05 NOTE — TELEPHONE ENCOUNTER
Refill Allopurinol 300mg 1 every day    [facility]     LR: 12/04/23  LV: 11/07/23  No future appt

## 2024-03-13 ENCOUNTER — TELEPHONE (OUTPATIENT)
Dept: PRIMARY CARE | Facility: CLINIC | Age: 69
End: 2024-03-13
Payer: MEDICARE

## 2024-03-13 DIAGNOSIS — E11.69 TYPE 2 DIABETES MELLITUS WITH OTHER SPECIFIED COMPLICATION, WITHOUT LONG-TERM CURRENT USE OF INSULIN (MULTI): ICD-10-CM

## 2024-03-13 NOTE — TELEPHONE ENCOUNTER
Refill Pioglitazone 30mg 1 every day    [facility]     LR: 12/04/23  LV: 11/07/23  No appt scheduled

## 2024-03-14 RX ORDER — PIOGLITAZONEHYDROCHLORIDE 30 MG/1
30 TABLET ORAL DAILY
Qty: 90 TABLET | Refills: 0 | Status: SHIPPED | OUTPATIENT
Start: 2024-03-14

## 2024-04-09 ENCOUNTER — TELEPHONE (OUTPATIENT)
Dept: PRIMARY CARE | Facility: CLINIC | Age: 69
End: 2024-04-09
Payer: MEDICARE

## 2024-04-09 DIAGNOSIS — E11.69 TYPE 2 DIABETES MELLITUS WITH OTHER SPECIFIED COMPLICATION, WITHOUT LONG-TERM CURRENT USE OF INSULIN (MULTI): ICD-10-CM

## 2024-04-09 RX ORDER — GLIPIZIDE 10 MG/1
10 TABLET, FILM COATED, EXTENDED RELEASE ORAL 2 TIMES DAILY
Qty: 180 TABLET | Refills: 0 | Status: SHIPPED | OUTPATIENT
Start: 2024-04-09

## 2024-04-09 NOTE — TELEPHONE ENCOUNTER
Refill Glipizide 10mg 1 BID     [facility]     LR: 12/22/23  LV: 11/07/23  No future appt

## 2024-04-12 ENCOUNTER — TELEPHONE (OUTPATIENT)
Dept: PRIMARY CARE | Facility: CLINIC | Age: 69
End: 2024-04-12
Payer: MEDICARE

## 2024-04-12 DIAGNOSIS — E03.9 ACQUIRED HYPOTHYROIDISM: ICD-10-CM

## 2024-04-12 RX ORDER — LEVOTHYROXINE SODIUM 137 UG/1
137 TABLET ORAL DAILY
Qty: 90 TABLET | Refills: 1 | Status: SHIPPED | OUTPATIENT
Start: 2024-04-12

## 2024-04-12 NOTE — TELEPHONE ENCOUNTER
Refill Levothyroxine 137mcg 1 every day    [facility]     LR: 10/2/23  LV: 11/07/23  No appt scheduled

## 2024-05-03 ENCOUNTER — OFFICE VISIT (OUTPATIENT)
Dept: CARDIOLOGY | Facility: CLINIC | Age: 69
End: 2024-05-03
Payer: MEDICARE

## 2024-05-03 VITALS
WEIGHT: 264 LBS | SYSTOLIC BLOOD PRESSURE: 138 MMHG | HEART RATE: 62 BPM | DIASTOLIC BLOOD PRESSURE: 82 MMHG | BODY MASS INDEX: 45.07 KG/M2 | HEIGHT: 64 IN

## 2024-05-03 DIAGNOSIS — R07.89 ATYPICAL CHEST PAIN: Primary | ICD-10-CM

## 2024-05-03 DIAGNOSIS — E66.01 CLASS 3 SEVERE OBESITY WITH SERIOUS COMORBIDITY AND BODY MASS INDEX (BMI) OF 45.0 TO 49.9 IN ADULT, UNSPECIFIED OBESITY TYPE (MULTI): ICD-10-CM

## 2024-05-03 PROCEDURE — 4010F ACE/ARB THERAPY RXD/TAKEN: CPT | Performed by: NURSE PRACTITIONER

## 2024-05-03 PROCEDURE — 3079F DIAST BP 80-89 MM HG: CPT | Performed by: NURSE PRACTITIONER

## 2024-05-03 PROCEDURE — 3008F BODY MASS INDEX DOCD: CPT | Performed by: NURSE PRACTITIONER

## 2024-05-03 PROCEDURE — 1159F MED LIST DOCD IN RCRD: CPT | Performed by: NURSE PRACTITIONER

## 2024-05-03 PROCEDURE — 3075F SYST BP GE 130 - 139MM HG: CPT | Performed by: NURSE PRACTITIONER

## 2024-05-03 PROCEDURE — 99204 OFFICE O/P NEW MOD 45 MIN: CPT | Performed by: NURSE PRACTITIONER

## 2024-05-03 PROCEDURE — 1160F RVW MEDS BY RX/DR IN RCRD: CPT | Performed by: NURSE PRACTITIONER

## 2024-05-03 NOTE — PROGRESS NOTES
"Subjective   Mary Ellen Gomez is a 69 y.o. female.    Chief Complaint:  68yo patient here for atypical chest pain.     HPI  68yo patient here for atypical chest pain.     PMHx: HTN, HLD, DMII c/b neuropathy, Obesity, Kidney Stones, Hypothyroidism  PSHx: Left Achilles Tendon Surgery, Toe Surgery  Social Hx: Former smoker. Quit 1994. Denies EtoH/illicit drug use.   Family Hx: Denies cardiovascular hx.     Endorses intermittent left upper sternal border pain x 1 year. \"Feels like someone punching me in the chest\". Radiating to left shoulder. Associated nausea, indigestion/heartburn. Not aggravated with exertion. Occurs at rest. Denies palpitations. Denies SOB on exertion. Sleeping in recliner due to back pain. Denies orthopnea/PND. Denies lightheadedness, dizziness, and syncope. Pedal edema. Medication adherent. Eating/drinking well. Denies vomiting/diarrhea.     Review of Systems   Constitutional: Negative for decreased appetite, weight gain and weight loss.   Cardiovascular:  Positive for chest pain and leg swelling. Negative for dyspnea on exertion, irregular heartbeat, near-syncope, orthopnea, palpitations, paroxysmal nocturnal dyspnea and syncope.   Respiratory:  Negative for shortness of breath and sleep disturbances due to breathing.    Gastrointestinal:  Positive for heartburn and nausea. Negative for diarrhea and vomiting.   Neurological:  Negative for dizziness and light-headedness.     Objective   Visit Vitals  /82 (BP Location: Right arm, Patient Position: Sitting)   Pulse 62   Ht 1.626 m (5' 4\")   Wt 120 kg (264 lb)   BMI 45.32 kg/m²   Smoking Status Former   BSA 2.33 m²     Vitals reviewed.   Constitutional:       Appearance: Healthy appearance.   Neck:      Vascular: No hepatojugular reflux or JVD. JVD normal.   Pulmonary:      Effort: Pulmonary effort is normal.      Breath sounds: Normal breath sounds.   Cardiovascular:      Normal rate. Regular rhythm. Normal S1. Normal S2.       Murmurs: There is " no murmur.      No gallop.  No click. No rub.   Edema:     Peripheral edema absent.   Abdominal:      General: There is no distension.   Skin:     General: Skin is warm and dry.   Neurological:      Mental Status: Alert and oriented to person, place and time.       Lab Review:   Lab Results   Component Value Date     03/16/2023    K 4.5 03/16/2023     03/16/2023    CO2 24 03/16/2023    BUN 22 03/16/2023    CREATININE 0.89 03/16/2023    GLUCOSE 123 (H) 03/16/2023    CALCIUM 9.2 03/16/2023     Lab Results   Component Value Date    WBC 7.1 03/16/2023    HGB 13.4 03/16/2023    HCT 41.6 03/16/2023    MCV 97 03/16/2023     03/16/2023       Current Outpatient Medications:     allopurinol (Zyloprim) 300 mg tablet, Take 1 tablet (300 mg) by mouth once daily., Disp: 90 tablet, Rfl: 0    atorvastatin (Lipitor) 40 mg tablet, Take 1 tablet (40 mg) by mouth once daily., Disp: 90 tablet, Rfl: 0    dicyclomine (Bentyl) 10 mg capsule, Take 1 capsule (10 mg) by mouth 3 times a day., Disp: 90 capsule, Rfl: 1    gabapentin (Neurontin) 300 mg capsule, Take 1 capsule (300 mg) by mouth once daily at bedtime., Disp: 90 capsule, Rfl: 0    glipiZIDE XL (Glucotrol XL) 10 mg 24 hr tablet, Take 1 tablet (10 mg) by mouth 2 times a day., Disp: 180 tablet, Rfl: 0    levothyroxine (Synthroid, Levoxyl) 137 mcg tablet, Take 1 tablet (137 mcg) by mouth once daily., Disp: 90 tablet, Rfl: 1    losartan (Cozaar) 100 mg tablet, Take 1 tablet (100 mg) by mouth once daily., Disp: 90 tablet, Rfl: 1    pioglitazone (Actos) 30 mg tablet, Take 1 tablet (30 mg) by mouth once daily., Disp: 90 tablet, Rfl: 0    tretinoin (Retin-A) 0.05 % cream, Apply 1 Application topically., Disp: , Rfl:     Autolet (Accu-Chek FastClix Lancing Dev) lancing device, 1 each if needed. Use as instructed, Disp: , Rfl:     benzoyl peroxide (PanoxyL) 10 % external wash, Apply topically 2 times a day., Disp: 148 g, Rfl: 1    blood sugar diagnostic (ACCU-CHEK PATRICIA  "MISC), by in vitro route., Disp: , Rfl:     blood sugar diagnostic (Accu-Chek Laurel Plus test strp) strip, once daily., Disp: , Rfl:     clindamycin (Clindagel) 1 % gel, Apply 1 Application topically., Disp: , Rfl:     cyclobenzaprine (Flexeril) 10 mg tablet, Take 1 tablet (10 mg) by mouth 3 times a day as needed for muscle spasms., Disp: 30 tablet, Rfl: 0    doxycycline (Monodox) 100 mg capsule, Take 1 capsule (100 mg) by mouth., Disp: , Rfl:     ibuprofen 800 mg tablet, Take 1 tablet (800 mg) by mouth every 8 hours if needed for mild pain (1 - 3)., Disp: , Rfl:     lancets misc, , Disp: , Rfl:     mupirocin (Bactroban) 2 % ointment, Apply 1 Application topically., Disp: , Rfl:     Assessment/Plan   Atypical Chest Pain  Endorses intermittent left upper sternal border pain x 1 year. \"Feels like someone punching me in the chest\". Radiating to left shoulder. Associated nausea, indigestion/heartburn. Not aggravated with exertion. Occurs at rest. No visible rash. Not tender to palpation or reproducible on exam. EKG in office 5/3/24 NSR w/1st AVB HR 66bpm.  Previous Exercise Tress 2021 TTE w/no evidence of stress-induced ischemia at max workload. Estimated current 10-year ASCVD Risk is High (20.3%). Referral for CT cardiac score. Probability of PE low due to lack of dyspnea, cough, and s/s of DVT. Exercise Stress TTE 2021 LVEF 60%. Order for BNP.     HTN  /82. Previous outpatient visits 110-120s/70s. Continue to monitor.     Obesity   BMI 45. Referral to [facility].   "

## 2024-05-03 NOTE — PATIENT INSTRUCTIONS
Please have labs drawn today. We will call you with any abnormal results.   Call [phone] to schedule CT cardiac scoring. We will call you with results.

## 2024-05-07 ASSESSMENT — ENCOUNTER SYMPTOMS
SYNCOPE: 0
DIARRHEA: 0
SHORTNESS OF BREATH: 0
VOMITING: 0
LIGHT-HEADEDNESS: 0
ORTHOPNEA: 0
HEARTBURN: 1
DIZZINESS: 0
IRREGULAR HEARTBEAT: 0
PALPITATIONS: 0
SLEEP DISTURBANCES DUE TO BREATHING: 0
NEAR-SYNCOPE: 0
WEIGHT GAIN: 0
PND: 0
NAUSEA: 1
DYSPNEA ON EXERTION: 0
WEIGHT LOSS: 0
DECREASED APPETITE: 0

## 2024-05-10 ENCOUNTER — TELEPHONE (OUTPATIENT)
Dept: PRIMARY CARE | Facility: CLINIC | Age: 69
End: 2024-05-10
Payer: MEDICARE

## 2024-05-10 DIAGNOSIS — E11.42 DIABETIC POLYNEUROPATHY ASSOCIATED WITH TYPE 2 DIABETES MELLITUS (MULTI): ICD-10-CM

## 2024-05-10 RX ORDER — GABAPENTIN 300 MG/1
300 CAPSULE ORAL NIGHTLY
Qty: 90 CAPSULE | Refills: 0 | Status: SHIPPED | OUTPATIENT
Start: 2024-05-10

## 2024-05-10 NOTE — TELEPHONE ENCOUNTER
Refill Gabapentin 300mg 1 at bedtime     [facility]     LR: 12/22/23  LV: 11/07/23  No appt scheduled

## 2024-06-05 ENCOUNTER — HOSPITAL ENCOUNTER (OUTPATIENT)
Dept: RADIOLOGY | Facility: HOSPITAL | Age: 69
Discharge: HOME | End: 2024-06-05
Payer: MEDICARE

## 2024-06-05 DIAGNOSIS — M10.9 GOUT, UNSPECIFIED CAUSE, UNSPECIFIED CHRONICITY, UNSPECIFIED SITE: ICD-10-CM

## 2024-06-05 DIAGNOSIS — R07.89 ATYPICAL CHEST PAIN: ICD-10-CM

## 2024-06-05 DIAGNOSIS — E11.69 TYPE 2 DIABETES MELLITUS WITH OTHER SPECIFIED COMPLICATION, WITHOUT LONG-TERM CURRENT USE OF INSULIN (MULTI): ICD-10-CM

## 2024-06-05 PROCEDURE — 75571 CT HRT W/O DYE W/CA TEST: CPT

## 2024-06-07 RX ORDER — ALLOPURINOL 300 MG/1
300 TABLET ORAL DAILY
Qty: 90 TABLET | Refills: 0 | Status: SHIPPED | OUTPATIENT
Start: 2024-06-07

## 2024-06-07 RX ORDER — ATORVASTATIN CALCIUM 40 MG/1
40 TABLET, FILM COATED ORAL DAILY
Qty: 90 TABLET | Refills: 0 | Status: SHIPPED | OUTPATIENT
Start: 2024-06-07

## 2024-06-21 ENCOUNTER — TELEPHONE (OUTPATIENT)
Dept: PRIMARY CARE | Facility: CLINIC | Age: 69
End: 2024-06-21
Payer: MEDICARE

## 2024-06-21 DIAGNOSIS — E11.69 TYPE 2 DIABETES MELLITUS WITH OTHER SPECIFIED COMPLICATION, WITHOUT LONG-TERM CURRENT USE OF INSULIN (MULTI): ICD-10-CM

## 2024-06-21 DIAGNOSIS — I10 BENIGN ESSENTIAL HYPERTENSION: ICD-10-CM

## 2024-06-21 RX ORDER — PIOGLITAZONEHYDROCHLORIDE 30 MG/1
30 TABLET ORAL DAILY
Qty: 90 TABLET | Refills: 0 | Status: SHIPPED | OUTPATIENT
Start: 2024-06-21

## 2024-06-21 RX ORDER — LOSARTAN POTASSIUM 100 MG/1
100 TABLET ORAL DAILY
Qty: 90 TABLET | Refills: 1 | Status: SHIPPED | OUTPATIENT
Start: 2024-06-21

## 2024-06-21 NOTE — TELEPHONE ENCOUNTER
REFILL  MEDICATION:     Pioglitazone 30 MG; Take 1 tablet once daily.    LR: 3/14/24      90 tablets with 0 refills     Losartan 100 MG; Take 1 tablet once daily.    LR: 11/28/23      90 tablets with 1 refill     PHARM: [facility]   PHARM NUMBER: [phone]    LV: 11/7/23  NV: 6/27/24

## 2024-06-27 ENCOUNTER — APPOINTMENT (OUTPATIENT)
Dept: PRIMARY CARE | Facility: CLINIC | Age: 69
End: 2024-06-27
Payer: COMMERCIAL

## 2024-07-08 ENCOUNTER — APPOINTMENT (OUTPATIENT)
Dept: PRIMARY CARE | Facility: CLINIC | Age: 69
End: 2024-07-08
Payer: MEDICARE

## 2024-07-08 VITALS
SYSTOLIC BLOOD PRESSURE: 120 MMHG | DIASTOLIC BLOOD PRESSURE: 70 MMHG | BODY MASS INDEX: 48.95 KG/M2 | WEIGHT: 266 LBS | TEMPERATURE: 97.4 F | HEIGHT: 62 IN

## 2024-07-08 DIAGNOSIS — Z12.31 ENCOUNTER FOR SCREENING MAMMOGRAM FOR BREAST CANCER: ICD-10-CM

## 2024-07-08 DIAGNOSIS — M54.50 RIGHT-SIDED LOW BACK PAIN WITHOUT SCIATICA, UNSPECIFIED CHRONICITY: ICD-10-CM

## 2024-07-08 DIAGNOSIS — E66.01 MORBID OBESITY WITH BMI OF 45.0-49.9, ADULT (MULTI): ICD-10-CM

## 2024-07-08 DIAGNOSIS — L70.9 ACNE, UNSPECIFIED ACNE TYPE: ICD-10-CM

## 2024-07-08 DIAGNOSIS — M25.551 RIGHT HIP PAIN: ICD-10-CM

## 2024-07-08 DIAGNOSIS — Z23 NEED FOR VACCINATION: ICD-10-CM

## 2024-07-08 DIAGNOSIS — Z78.0 ASYMPTOMATIC MENOPAUSAL STATE: ICD-10-CM

## 2024-07-08 DIAGNOSIS — D22.9 NUMEROUS MOLES: ICD-10-CM

## 2024-07-08 DIAGNOSIS — E11.69 TYPE 2 DIABETES MELLITUS WITH OTHER SPECIFIED COMPLICATION, WITHOUT LONG-TERM CURRENT USE OF INSULIN (MULTI): ICD-10-CM

## 2024-07-08 DIAGNOSIS — Z00.00 ROUTINE GENERAL MEDICAL EXAMINATION AT HEALTH CARE FACILITY: Primary | ICD-10-CM

## 2024-07-08 LAB
ALBUMIN SERPL BCP-MCNC: 4.1 G/DL (ref 3.4–5)
ALP SERPL-CCNC: 57 U/L (ref 33–136)
ALT SERPL W P-5'-P-CCNC: 18 U/L (ref 7–45)
ANION GAP SERPL CALC-SCNC: 15 MMOL/L (ref 10–20)
AST SERPL W P-5'-P-CCNC: 14 U/L (ref 9–39)
BASOPHILS # BLD AUTO: 0.03 X10*3/UL (ref 0–0.1)
BASOPHILS NFR BLD AUTO: 0.5 %
BILIRUB SERPL-MCNC: 0.9 MG/DL (ref 0–1.2)
BUN SERPL-MCNC: 22 MG/DL (ref 6–23)
CALCIUM SERPL-MCNC: 9.3 MG/DL (ref 8.6–10.3)
CHLORIDE SERPL-SCNC: 105 MMOL/L (ref 98–107)
CHOLEST SERPL-MCNC: 154 MG/DL (ref 0–199)
CHOLESTEROL/HDL RATIO: 3.3
CO2 SERPL-SCNC: 26 MMOL/L (ref 21–32)
CREAT SERPL-MCNC: 0.87 MG/DL (ref 0.5–1.05)
EGFRCR SERPLBLD CKD-EPI 2021: 73 ML/MIN/1.73M*2
EOSINOPHIL # BLD AUTO: 0.09 X10*3/UL (ref 0–0.7)
EOSINOPHIL NFR BLD AUTO: 1.4 %
ERYTHROCYTE [DISTWIDTH] IN BLOOD BY AUTOMATED COUNT: 12.6 % (ref 11.5–14.5)
GLUCOSE SERPL-MCNC: 212 MG/DL (ref 74–99)
HCT VFR BLD AUTO: 42.9 % (ref 36–46)
HDLC SERPL-MCNC: 47.1 MG/DL
HGB BLD-MCNC: 14 G/DL (ref 12–16)
IMM GRANULOCYTES # BLD AUTO: 0.02 X10*3/UL (ref 0–0.7)
IMM GRANULOCYTES NFR BLD AUTO: 0.3 % (ref 0–0.9)
LDLC SERPL CALC-MCNC: 83 MG/DL
LYMPHOCYTES # BLD AUTO: 1.81 X10*3/UL (ref 1.2–4.8)
LYMPHOCYTES NFR BLD AUTO: 27.4 %
MCH RBC QN AUTO: 31.7 PG (ref 26–34)
MCHC RBC AUTO-ENTMCNC: 32.6 G/DL (ref 32–36)
MCV RBC AUTO: 97 FL (ref 80–100)
MONOCYTES # BLD AUTO: 0.57 X10*3/UL (ref 0.1–1)
MONOCYTES NFR BLD AUTO: 8.6 %
NEUTROPHILS # BLD AUTO: 4.08 X10*3/UL (ref 1.2–7.7)
NEUTROPHILS NFR BLD AUTO: 61.8 %
NON HDL CHOLESTEROL: 107 MG/DL (ref 0–149)
NRBC BLD-RTO: 0 /100 WBCS (ref 0–0)
PLATELET # BLD AUTO: 234 X10*3/UL (ref 150–450)
POTASSIUM SERPL-SCNC: 4.7 MMOL/L (ref 3.5–5.3)
PROT SERPL-MCNC: 6.6 G/DL (ref 6.4–8.2)
RBC # BLD AUTO: 4.42 X10*6/UL (ref 4–5.2)
SODIUM SERPL-SCNC: 141 MMOL/L (ref 136–145)
TRIGL SERPL-MCNC: 122 MG/DL (ref 0–149)
TSH SERPL-ACNC: 3.36 MIU/L (ref 0.44–3.98)
VLDL: 24 MG/DL (ref 0–40)
WBC # BLD AUTO: 6.6 X10*3/UL (ref 4.4–11.3)

## 2024-07-08 PROCEDURE — 83036 HEMOGLOBIN GLYCOSYLATED A1C: CPT

## 2024-07-08 PROCEDURE — 1158F ADVNC CARE PLAN TLK DOCD: CPT | Performed by: FAMILY MEDICINE

## 2024-07-08 PROCEDURE — 4010F ACE/ARB THERAPY RXD/TAKEN: CPT | Performed by: FAMILY MEDICINE

## 2024-07-08 PROCEDURE — 80053 COMPREHEN METABOLIC PANEL: CPT

## 2024-07-08 PROCEDURE — 84443 ASSAY THYROID STIM HORMONE: CPT

## 2024-07-08 PROCEDURE — 3078F DIAST BP <80 MM HG: CPT | Performed by: FAMILY MEDICINE

## 2024-07-08 PROCEDURE — 1160F RVW MEDS BY RX/DR IN RCRD: CPT | Performed by: FAMILY MEDICINE

## 2024-07-08 PROCEDURE — 3008F BODY MASS INDEX DOCD: CPT | Performed by: FAMILY MEDICINE

## 2024-07-08 PROCEDURE — 1123F ACP DISCUSS/DSCN MKR DOCD: CPT | Performed by: FAMILY MEDICINE

## 2024-07-08 PROCEDURE — 80061 LIPID PANEL: CPT

## 2024-07-08 PROCEDURE — 3074F SYST BP LT 130 MM HG: CPT | Performed by: FAMILY MEDICINE

## 2024-07-08 PROCEDURE — 90677 PCV20 VACCINE IM: CPT | Performed by: FAMILY MEDICINE

## 2024-07-08 PROCEDURE — 1036F TOBACCO NON-USER: CPT | Performed by: FAMILY MEDICINE

## 2024-07-08 PROCEDURE — G0439 PPPS, SUBSEQ VISIT: HCPCS | Performed by: FAMILY MEDICINE

## 2024-07-08 PROCEDURE — 1159F MED LIST DOCD IN RCRD: CPT | Performed by: FAMILY MEDICINE

## 2024-07-08 PROCEDURE — G0009 ADMIN PNEUMOCOCCAL VACCINE: HCPCS | Performed by: FAMILY MEDICINE

## 2024-07-08 PROCEDURE — 36415 COLL VENOUS BLD VENIPUNCTURE: CPT

## 2024-07-08 PROCEDURE — 85025 COMPLETE CBC W/AUTO DIFF WBC: CPT

## 2024-07-08 PROCEDURE — 1170F FXNL STATUS ASSESSED: CPT | Performed by: FAMILY MEDICINE

## 2024-07-08 RX ORDER — TRETINOIN 0.5 MG/G
1 CREAM TOPICAL NIGHTLY
Qty: 45 G | Refills: 0 | Status: SHIPPED | OUTPATIENT
Start: 2024-07-08

## 2024-07-08 RX ORDER — IBUPROFEN 800 MG/1
800 TABLET ORAL EVERY 8 HOURS PRN
Qty: 90 TABLET | Refills: 0 | Status: SHIPPED | OUTPATIENT
Start: 2024-07-08

## 2024-07-08 RX ORDER — DULAGLUTIDE 0.75 MG/.5ML
0.75 INJECTION, SOLUTION SUBCUTANEOUS
Qty: 2 ML | Refills: 2 | Status: SHIPPED | OUTPATIENT
Start: 2024-07-14

## 2024-07-08 RX ORDER — CLINDAMYCIN PHOSPHATE 10 MG/G
1 GEL TOPICAL 2 TIMES DAILY
Qty: 60 G | Refills: 0 | Status: SHIPPED | OUTPATIENT
Start: 2024-07-08

## 2024-07-08 RX ORDER — GLIPIZIDE 10 MG/1
10 TABLET, FILM COATED, EXTENDED RELEASE ORAL 2 TIMES DAILY
Qty: 180 TABLET | Refills: 1 | Status: SHIPPED | OUTPATIENT
Start: 2024-07-08

## 2024-07-08 RX ORDER — CYCLOBENZAPRINE HCL 10 MG
10 TABLET ORAL 3 TIMES DAILY PRN
Qty: 30 TABLET | Refills: 0 | Status: SHIPPED | OUTPATIENT
Start: 2024-07-08 | End: 2024-09-06

## 2024-07-08 ASSESSMENT — ENCOUNTER SYMPTOMS
ROS SKIN COMMENTS: NO MOLES GROWING OR CHANGING.
DIZZINESS: 0
LOSS OF SENSATION IN FEET: 1
NERVOUS/ANXIOUS: 0
DYSURIA: 0
BLOOD IN STOOL: 0
SLEEP DISTURBANCE: 0
HEADACHES: 0
FATIGUE: 0
WOUND: 0
OCCASIONAL FEELINGS OF UNSTEADINESS: 0
ARTHRALGIAS: 0
MYALGIAS: 0
SINUS PRESSURE: 0
DIARRHEA: 1
VOMITING: 0
VOICE CHANGE: 0
SORE THROAT: 0
DEPRESSION: 0
CONSTIPATION: 0
NAUSEA: 0
BACK PAIN: 1
DYSPHORIC MOOD: 0
WHEEZING: 0
ABDOMINAL PAIN: 1
FREQUENCY: 0
WEAKNESS: 0
FEVER: 0
HEMATURIA: 0
ADENOPATHY: 0
PALPITATIONS: 0
RHINORRHEA: 0
NUMBNESS: 1
COUGH: 0
SHORTNESS OF BREATH: 0

## 2024-07-08 ASSESSMENT — ACTIVITIES OF DAILY LIVING (ADL)
DOING_HOUSEWORK: INDEPENDENT
TAKING_MEDICATION: INDEPENDENT
DRESSING: INDEPENDENT
MANAGING_FINANCES: INDEPENDENT
GROCERY_SHOPPING: INDEPENDENT
BATHING: INDEPENDENT

## 2024-07-08 NOTE — PROGRESS NOTES
Subjective   Reason for Visit: Mary Ellen Gomez is an 69 y.o. female here for a Medicare Wellness visit.          Reviewed all medications by prescribing practitioner or clinical pharmacist (such as prescriptions, OTCs, herbal therapies and supplements) and documented in the medical record.    HPI  Tobacco Use: Medium Risk (7/8/2024)    Patient History     Smoking Tobacco Use: Former     Smokeless Tobacco Use: Never     Passive Exposure: Past   Alcohol very rare.  No drugs.    No hospitalizations or surgeries in the last year.    Current Outpatient Medications on File Prior to Visit   Medication Sig Dispense Refill    allopurinol (Zyloprim) 300 mg tablet Take 1 tablet (300 mg) by mouth once daily. 90 tablet 0    atorvastatin (Lipitor) 40 mg tablet Take 1 tablet (40 mg) by mouth once daily. 90 tablet 0    dicyclomine (Bentyl) 10 mg capsule Take 1 capsule (10 mg) by mouth 3 times a day. 90 capsule 1    gabapentin (Neurontin) 300 mg capsule Take 1 capsule (300 mg) by mouth once daily at bedtime. 90 capsule 0    levothyroxine (Synthroid, Levoxyl) 137 mcg tablet Take 1 tablet (137 mcg) by mouth once daily. 90 tablet 1    losartan (Cozaar) 100 mg tablet Take 1 tablet (100 mg) by mouth once daily. 90 tablet 1    mupirocin (Bactroban) 2 % ointment Apply 1 Application topically.      pioglitazone (Actos) 30 mg tablet Take 1 tablet (30 mg) by mouth once daily. 90 tablet 0    [DISCONTINUED] clindamycin (Clindagel) 1 % gel Apply 1 Application topically.      [DISCONTINUED] doxycycline (Monodox) 100 mg capsule Take 1 capsule (100 mg) by mouth.      [DISCONTINUED] glipiZIDE XL (Glucotrol XL) 10 mg 24 hr tablet Take 1 tablet (10 mg) by mouth 2 times a day. 180 tablet 0    [DISCONTINUED] ibuprofen 800 mg tablet Take 1 tablet (800 mg) by mouth every 8 hours if needed for mild pain (1 - 3).      [DISCONTINUED] tretinoin (Retin-A) 0.05 % cream Apply 1 Application topically.      [DISCONTINUED] Autolet (Accu-Chek FastClix Lancing Dev)  lancing device 1 each if needed. Use as instructed      [DISCONTINUED] blood sugar diagnostic (ACCU-CHEK PATRICIA MISC) by in vitro route.      [DISCONTINUED] blood sugar diagnostic (Accu-Chek Patricia Plus test strp) strip once daily.      [DISCONTINUED] cyclobenzaprine (Flexeril) 10 mg tablet Take 1 tablet (10 mg) by mouth 3 times a day as needed for muscle spasms. 30 tablet 0    [DISCONTINUED] lancets misc        No current facility-administered medications on file prior to visit.     Has advanced directives.  Advised to bring in copy.  Sister would be POA.      Patient Care Team:  Ponce Amos DO as PCP - General  Ponce Amos DO as PCP - MSSP ACO Attributed Provider     Review of Systems   Constitutional:  Negative for fatigue and fever.   HENT:  Negative for rhinorrhea, sinus pressure, sore throat and voice change.    Respiratory:  Negative for cough, shortness of breath and wheezing.    Cardiovascular:  Positive for chest pain (has had chest pain two months ago.  she saw cardiology and was checked as ok from a heart standpoint.  The chest pain has gone away.). Negative for palpitations and leg swelling.   Gastrointestinal:  Positive for abdominal pain (just my usual stomach issues. IBS.  diet modification helps.  Had a colonoscopy showing one polyp.) and diarrhea. Negative for blood in stool, constipation, nausea and vomiting.   Genitourinary:  Negative for dysuria, frequency, hematuria and vaginal bleeding.   Musculoskeletal:  Positive for back pain. Negative for arthralgias and myalgias.   Skin:  Positive for rash (acne on face). Negative for wound.        No moles growing or changing.   Neurological:  Positive for numbness (feet are always numb.  on gabapentin.  sees podiatry, who suggested doubling gabapentin.). Negative for dizziness, syncope, weakness and headaches.   Hematological:  Negative for adenopathy.   Psychiatric/Behavioral:  Negative for dysphoric mood, self-injury, sleep disturbance  "and suicidal ideas. The patient is not nervous/anxious.        Objective   Vitals:  /70 (BP Location: Right arm, Patient Position: Sitting)   Temp 36.3 °C (97.4 °F) (Skin)   Ht 1.575 m (5' 2\")   Wt 121 kg (266 lb)   BMI 48.65 kg/m²       Physical Exam  Vitals reviewed.   Constitutional:       General: She is not in acute distress.     Appearance: Normal appearance. She is not ill-appearing or toxic-appearing.   HENT:      Head: Normocephalic and atraumatic.      Right Ear: Tympanic membrane, ear canal and external ear normal.      Left Ear: Tympanic membrane, ear canal and external ear normal.      Nose: Nose normal.      Mouth/Throat:      Mouth: Mucous membranes are moist.   Eyes:      Extraocular Movements: Extraocular movements intact.      Conjunctiva/sclera: Conjunctivae normal.      Pupils: Pupils are equal, round, and reactive to light.   Cardiovascular:      Rate and Rhythm: Normal rate and regular rhythm.      Heart sounds: No murmur heard.  Pulmonary:      Effort: Pulmonary effort is normal.      Breath sounds: Normal breath sounds.   Abdominal:      General: Bowel sounds are normal. There is no distension.      Palpations: Abdomen is soft. There is no mass.      Tenderness: There is abdominal tenderness (tender in RLQ and LLQ to palpation). There is no guarding or rebound.   Musculoskeletal:         General: No tenderness.      Cervical back: Neck supple.      Right lower leg: No edema.      Left lower leg: No edema.   Skin:     Coloration: Skin is not jaundiced or pale.      Findings: No rash.      Comments: Numerous moles.  Appear to be seborrheic keratosis   Neurological:      General: No focal deficit present.      Mental Status: She is alert and oriented to person, place, and time. Mental status is at baseline.   Psychiatric:         Mood and Affect: Mood normal.         Behavior: Behavior normal.         Thought Content: Thought content normal.         Judgment: Judgment normal. "         Assessment/Plan   Problem List Items Addressed This Visit       Diabetes mellitus (Multi)    Relevant Medications    glipiZIDE XL (Glucotrol XL) 10 mg 24 hr tablet    dulaglutide (Trulicity) 0.75 mg/0.5 mL pen injector (Start on 7/14/2024)    Other Relevant Orders    CBC and Auto Differential    Referral to Nutrition Services    Lumbago    Relevant Medications    cyclobenzaprine (Flexeril) 10 mg tablet    ibuprofen 800 mg tablet    Morbid obesity with BMI of 45.0-49.9, adult (Multi)    Relevant Orders    Referral to Nutrition Services     Other Visit Diagnoses       Routine general medical examination at health care facility    -  Primary    Relevant Orders    Hemoglobin A1C    Comprehensive Metabolic Panel    Lipid Panel    Thyroid Stimulating Hormone    Need for vaccination        Relevant Orders    Pneumococcal conjugate vaccine 20-valent IM    Asymptomatic menopausal state        Relevant Orders    XR DEXA bone density    Encounter for screening mammogram for breast cancer        Relevant Orders    BI mammo bilateral screening tomosynthesis    Right hip pain        Relevant Medications    cyclobenzaprine (Flexeril) 10 mg tablet    Acne, unspecified acne type        Relevant Medications    clindamycin (Cleocin T) 1 % gel    tretinoin (Retin-A) 0.05 % cream    Numerous moles        Relevant Orders    Referral to Dermatology          Annual Wellness exam completed   Preventive Health history reviewed:  Labs ordered    Mammogram ordered  BMD ordered  Cscope done 3/2024.    Low dose CT chest for lung cancer screening not indicated.  Quit smoking 35 years ago.  Depression Screening done  Advanced Directives Discussion Completed  Cardiovascular risk discussed and if needed, lifestyle modifications recommended, including nutritional choices, exercise, and elimination of habits contributing to risk.  We agreed on a plan to reduce the current cardiovascular risk.  See ecalc ASCVD Risk  Plus for data  discussed regarding risk and risk reduction opportunities.  Aspirin use was not recommended after reviewing the updated guidelines.   Vaccines:  Influenza recommended yearly in the fall.  Prevnar 20  Prevnar 13 done 2017  Pneumovax 23 done 2021  Shingrix completed 2018    I will order labs and give a Prevnar 20 to protect against strains of pneumococcal pneumonia.  Weight loss is recommended.  I referred you to a nutritionist.  I started you on Trulicity.  A mammogram and bone density scan were ordered.  Return in three months.

## 2024-07-08 NOTE — PATIENT INSTRUCTIONS
I will order labs and give a Prevnar 20 to protect against strains of pneumococcal pneumonia.  Weight loss is recommended.  I referred you to a nutritionist.  I started you on Trulicity.  A mammogram and bone density scan were ordered.  Return in three months.

## 2024-07-09 LAB
EST. AVERAGE GLUCOSE BLD GHB EST-MCNC: 200 MG/DL
HBA1C MFR BLD: 8.6 %

## 2024-07-16 ENCOUNTER — PATIENT OUTREACH (OUTPATIENT)
Dept: CARE COORDINATION | Facility: CLINIC | Age: 69
End: 2024-07-16
Payer: MEDICARE

## 2024-07-22 ENCOUNTER — TELEPHONE (OUTPATIENT)
Dept: PRIMARY CARE | Facility: CLINIC | Age: 69
End: 2024-07-22
Payer: MEDICARE

## 2024-07-22 DIAGNOSIS — E03.9 ACQUIRED HYPOTHYROIDISM: ICD-10-CM

## 2024-07-22 DIAGNOSIS — K58.9 IRRITABLE BOWEL SYNDROME, UNSPECIFIED TYPE: ICD-10-CM

## 2024-07-22 DIAGNOSIS — E11.42 DIABETIC POLYNEUROPATHY ASSOCIATED WITH TYPE 2 DIABETES MELLITUS (MULTI): ICD-10-CM

## 2024-07-22 RX ORDER — GABAPENTIN 300 MG/1
600 CAPSULE ORAL NIGHTLY
Qty: 90 CAPSULE | Refills: 0 | Status: SHIPPED | OUTPATIENT
Start: 2024-07-22

## 2024-07-22 RX ORDER — LEVOTHYROXINE SODIUM 137 UG/1
137 TABLET ORAL DAILY
Qty: 90 TABLET | Refills: 1 | Status: SHIPPED | OUTPATIENT
Start: 2024-07-22

## 2024-07-22 RX ORDER — DICYCLOMINE HYDROCHLORIDE 10 MG/1
10 CAPSULE ORAL 3 TIMES DAILY
Qty: 90 CAPSULE | Refills: 1 | Status: SHIPPED | OUTPATIENT
Start: 2024-07-22

## 2024-07-22 NOTE — TELEPHONE ENCOUNTER
Pt is calling requesting med refills. Pt stated that at her appointment it was discussed to increase her Gabapentin to 2 capsules at bedtime. Pt is asking if you can send her script for 2 capsules?    REFILL  MEDICATION:     Levothyroxine 137 MCG; Take 1 tablet once daily.    LR: 4/12/24     90 tablets with 1 refills     Gabapentin 300 MG; Take 2 capsules once daily at bedtime.    LR: 5/10/24      90 capsules with 0 refills     Dicyclomine 10 MG; Take 1 capsule 3 times a day.    LR: 10/2/23      90 capsules with 1 refill     [facility]: [facility] NUMBER: [phone]    LV: 7/8/24  NV: No future appt.

## 2024-07-23 ASSESSMENT — LIFESTYLE VARIABLES
3_OR_MORE_DRINKS_PER_DAY: N
CURRENT_SMOKER: N

## 2024-07-24 ENCOUNTER — HOSPITAL ENCOUNTER (OUTPATIENT)
Dept: RADIOLOGY | Facility: CLINIC | Age: 69
Discharge: HOME | End: 2024-07-24
Payer: MEDICARE

## 2024-07-24 VITALS — HEIGHT: 62 IN | WEIGHT: 275.57 LBS | BODY MASS INDEX: 50.71 KG/M2

## 2024-07-24 DIAGNOSIS — Z12.31 ENCOUNTER FOR SCREENING MAMMOGRAM FOR BREAST CANCER: ICD-10-CM

## 2024-07-24 PROCEDURE — 77067 SCR MAMMO BI INCL CAD: CPT

## 2024-07-25 ENCOUNTER — PATIENT OUTREACH (OUTPATIENT)
Dept: ENDOCRINOLOGY | Facility: CLINIC | Age: 69
End: 2024-07-25
Payer: MEDICARE

## 2024-07-25 ASSESSMENT — LIFESTYLE VARIABLES
CURRENT_SMOKER: N
3_OR_MORE_DRINKS_PER_DAY: N

## 2024-07-26 ENCOUNTER — APPOINTMENT (OUTPATIENT)
Dept: RADIOLOGY | Facility: CLINIC | Age: 69
End: 2024-07-26
Payer: MEDICARE

## 2024-07-29 ENCOUNTER — HOSPITAL ENCOUNTER (OUTPATIENT)
Dept: RADIOLOGY | Facility: CLINIC | Age: 69
Discharge: HOME | End: 2024-07-29
Payer: MEDICARE

## 2024-07-29 DIAGNOSIS — Z78.0 ASYMPTOMATIC MENOPAUSAL STATE: ICD-10-CM

## 2024-07-29 PROCEDURE — 77080 DXA BONE DENSITY AXIAL: CPT

## 2024-08-20 ENCOUNTER — PATIENT OUTREACH (OUTPATIENT)
Dept: CARE COORDINATION | Facility: CLINIC | Age: 69
End: 2024-08-20
Payer: MEDICARE

## 2024-09-05 ENCOUNTER — TELEPHONE (OUTPATIENT)
Dept: PRIMARY CARE | Facility: CLINIC | Age: 69
End: 2024-09-05
Payer: MEDICARE

## 2024-09-05 DIAGNOSIS — E11.69 TYPE 2 DIABETES MELLITUS WITH OTHER SPECIFIED COMPLICATION, WITHOUT LONG-TERM CURRENT USE OF INSULIN (MULTI): ICD-10-CM

## 2024-09-05 RX ORDER — ATORVASTATIN CALCIUM 40 MG/1
40 TABLET, FILM COATED ORAL DAILY
Qty: 90 TABLET | Refills: 0 | Status: SHIPPED | OUTPATIENT
Start: 2024-09-05

## 2024-09-05 NOTE — TELEPHONE ENCOUNTER
REFILL  MEDICATION:     Atorvastatin 40 MG; One tablet once daily.     PHARM: [facility]   PHARM NUMBER: [phone]    LR: 6/7/24      90 tablets with 0 refills   LV: 7/8/24  NV: No future appt.

## 2024-09-20 ENCOUNTER — TELEPHONE (OUTPATIENT)
Dept: PRIMARY CARE | Facility: CLINIC | Age: 69
End: 2024-09-20
Payer: MEDICARE

## 2024-09-20 DIAGNOSIS — M10.9 GOUT, UNSPECIFIED CAUSE, UNSPECIFIED CHRONICITY, UNSPECIFIED SITE: ICD-10-CM

## 2024-09-20 DIAGNOSIS — E11.69 TYPE 2 DIABETES MELLITUS WITH OTHER SPECIFIED COMPLICATION, WITHOUT LONG-TERM CURRENT USE OF INSULIN: ICD-10-CM

## 2024-09-20 RX ORDER — PIOGLITAZONEHYDROCHLORIDE 30 MG/1
30 TABLET ORAL DAILY
Qty: 90 TABLET | Refills: 0 | Status: SHIPPED | OUTPATIENT
Start: 2024-09-20

## 2024-09-20 RX ORDER — ALLOPURINOL 300 MG/1
300 TABLET ORAL DAILY
Qty: 90 TABLET | Refills: 0 | Status: SHIPPED | OUTPATIENT
Start: 2024-09-20

## 2024-09-20 NOTE — TELEPHONE ENCOUNTER
REFILL  MEDICATION:     Pioglitazone 30 MG; One tablet daily.    LR: 6/21/24       90 tablets with 0 refills     Allopurinol 300 MG; One tablet daily.    LR: 6/7/24      90 tablets with 0 refills     PHARM: [facility] NUMBER: [phone]    LV: 7/8/24  NV: No future appt.

## 2024-10-21 ENCOUNTER — TELEPHONE (OUTPATIENT)
Dept: PRIMARY CARE | Facility: CLINIC | Age: 69
End: 2024-10-21
Payer: MEDICARE

## 2024-10-21 DIAGNOSIS — E11.42 DIABETIC POLYNEUROPATHY ASSOCIATED WITH TYPE 2 DIABETES MELLITUS (MULTI): ICD-10-CM

## 2024-10-21 RX ORDER — GABAPENTIN 300 MG/1
600 CAPSULE ORAL NIGHTLY
Qty: 90 CAPSULE | Refills: 0 | Status: SHIPPED | OUTPATIENT
Start: 2024-10-21

## 2024-10-21 NOTE — TELEPHONE ENCOUNTER
Pt is requesting a refill on    Gabapentin 300 mg 2 tabs at night   LR 7/22/2024 # 90    [facility]  [phone]    LV 7/8/24  ROZ CHUNG

## 2024-10-22 ENCOUNTER — APPOINTMENT (OUTPATIENT)
Dept: VASCULAR SURGERY | Facility: CLINIC | Age: 69
End: 2024-10-22
Payer: MEDICARE

## 2024-11-08 ENCOUNTER — APPOINTMENT (OUTPATIENT)
Dept: NEUROLOGY | Facility: CLINIC | Age: 69
End: 2024-11-08
Payer: MEDICARE

## 2024-11-11 ENCOUNTER — OFFICE VISIT (OUTPATIENT)
Dept: VASCULAR SURGERY | Facility: CLINIC | Age: 69
End: 2024-11-11
Payer: MEDICARE

## 2024-11-11 VITALS
HEIGHT: 64 IN | BODY MASS INDEX: 45.41 KG/M2 | WEIGHT: 266 LBS | DIASTOLIC BLOOD PRESSURE: 78 MMHG | SYSTOLIC BLOOD PRESSURE: 126 MMHG | HEART RATE: 72 BPM

## 2024-11-11 DIAGNOSIS — H93.13 TINNITUS OF BOTH EARS: Primary | ICD-10-CM

## 2024-11-11 PROCEDURE — 3074F SYST BP LT 130 MM HG: CPT | Performed by: NURSE PRACTITIONER

## 2024-11-11 PROCEDURE — 3052F HG A1C>EQUAL 8.0%<EQUAL 9.0%: CPT | Performed by: NURSE PRACTITIONER

## 2024-11-11 PROCEDURE — 1160F RVW MEDS BY RX/DR IN RCRD: CPT | Performed by: NURSE PRACTITIONER

## 2024-11-11 PROCEDURE — 1123F ACP DISCUSS/DSCN MKR DOCD: CPT | Performed by: NURSE PRACTITIONER

## 2024-11-11 PROCEDURE — 99213 OFFICE O/P EST LOW 20 MIN: CPT | Performed by: NURSE PRACTITIONER

## 2024-11-11 PROCEDURE — 3048F LDL-C <100 MG/DL: CPT | Performed by: NURSE PRACTITIONER

## 2024-11-11 PROCEDURE — 3078F DIAST BP <80 MM HG: CPT | Performed by: NURSE PRACTITIONER

## 2024-11-11 PROCEDURE — 3008F BODY MASS INDEX DOCD: CPT | Performed by: NURSE PRACTITIONER

## 2024-11-11 PROCEDURE — 1126F AMNT PAIN NOTED NONE PRSNT: CPT | Performed by: NURSE PRACTITIONER

## 2024-11-11 PROCEDURE — 4010F ACE/ARB THERAPY RXD/TAKEN: CPT | Performed by: NURSE PRACTITIONER

## 2024-11-11 PROCEDURE — 1159F MED LIST DOCD IN RCRD: CPT | Performed by: NURSE PRACTITIONER

## 2024-11-11 ASSESSMENT — PAIN SCALES - GENERAL: PAINLEVEL_OUTOF10: 0-NO PAIN

## 2024-11-11 NOTE — PROGRESS NOTES
"Subjective   Mary Ellen Gomez is a 69 y.o. female.    Chief Complaint:  68yo patient here for new tinnitus, heart pounding in ears, and dizziness.     HPI  Reports new tinnitus in bilateral ears, able to hear heart pounding in bilateral ears, and dizziness.     PMHx: HTN, HLD, DMII c/b neuropathy, Obesity, Kidney Stones, Hypothyroidism  PSHx: Left Achilles Tendon Surgery, Toe Surgery  Social Hx: Former smoker. Quit 1994. Denies EtoH/illicit drug use.   Family Hx: Denies cardiovascular hx.     Review of Systems   HENT:          Tinnitus   Cardiovascular:  Positive for palpitations.   Neurological:  Positive for dizziness.     Objective   Visit Vitals  /78 (BP Location: Left arm, Patient Position: Sitting)   Pulse 72   Ht 1.626 m (5' 4\")   Wt 121 kg (266 lb)   BMI 45.66 kg/m²   OB Status Hysterectomy   Smoking Status Former   BSA 2.34 m²     Vitals reviewed.   Constitutional:       Appearance: Healthy appearance.   Neck:      Vascular: No hepatojugular reflux or JVD. JVD normal.   Pulmonary:      Effort: Pulmonary effort is normal.      Breath sounds: Normal breath sounds.   Cardiovascular:      Normal rate. Regular rhythm. Normal S1. Normal S2.       Murmurs: There is no murmur.      No gallop.  No click. No rub.   Edema:     Peripheral edema absent.   Abdominal:      General: There is no distension.   Skin:     General: Skin is warm and dry.   Neurological:      Mental Status: Alert and oriented to person, place and time.      Lab Review:   Lab Results   Component Value Date     07/08/2024    K 4.7 07/08/2024     07/08/2024    CO2 26 07/08/2024    BUN 22 07/08/2024    CREATININE 0.87 07/08/2024    GLUCOSE 212 (H) 07/08/2024    CALCIUM 9.3 07/08/2024     Lab Results   Component Value Date    WBC 6.6 07/08/2024    HGB 14.0 07/08/2024    HCT 42.9 07/08/2024    MCV 97 07/08/2024     07/08/2024     Lab Results   Component Value Date    CHOL 154 07/08/2024    TRIG 122 07/08/2024    HDL 47.1 " 07/08/2024       Current Outpatient Medications:     allopurinol (Zyloprim) 300 mg tablet, Take 1 tablet (300 mg) by mouth once daily., Disp: 90 tablet, Rfl: 0    atorvastatin (Lipitor) 40 mg tablet, Take 1 tablet (40 mg) by mouth once daily., Disp: 90 tablet, Rfl: 0    clindamycin (Cleocin T) 1 % gel, Apply 1 Application topically 2 times a day., Disp: 60 g, Rfl: 0    cyclobenzaprine (Flexeril) 10 mg tablet, Take 1 tablet (10 mg) by mouth 3 times a day as needed for muscle spasms., Disp: 30 tablet, Rfl: 0    dicyclomine (Bentyl) 10 mg capsule, Take 1 capsule (10 mg) by mouth 3 times a day., Disp: 90 capsule, Rfl: 1    dulaglutide (Trulicity) 0.75 mg/0.5 mL pen injector, Inject 0.75 mg under the skin 1 (one) time per week., Disp: 2 mL, Rfl: 2    gabapentin (Neurontin) 300 mg capsule, Take 2 capsules (600 mg) by mouth once daily at bedtime., Disp: 90 capsule, Rfl: 0    glipiZIDE XL (Glucotrol XL) 10 mg 24 hr tablet, Take 1 tablet (10 mg) by mouth 2 times a day., Disp: 180 tablet, Rfl: 1    ibuprofen 800 mg tablet, Take 1 tablet (800 mg) by mouth every 8 hours if needed for mild pain (1 - 3)., Disp: 90 tablet, Rfl: 0    levothyroxine (Synthroid, Levoxyl) 137 mcg tablet, Take 1 tablet (137 mcg) by mouth once daily., Disp: 90 tablet, Rfl: 1    losartan (Cozaar) 100 mg tablet, Take 1 tablet (100 mg) by mouth once daily., Disp: 90 tablet, Rfl: 1    mupirocin (Bactroban) 2 % ointment, Apply 1 Application topically., Disp: , Rfl:     pioglitazone (Actos) 30 mg tablet, Take 1 tablet (30 mg) by mouth once daily., Disp: 90 tablet, Rfl: 0    tretinoin (Retin-A) 0.05 % cream, Apply 1 Application topically once daily at bedtime., Disp: 45 g, Rfl: 0    Assessment/Plan   Tinnitus  Bilateral pulsatile tinnitus. Denies hearing loss. Denies ear/facial pain. Denies facial palsy. Endorses dizziness.   No hearing loss. DLD well controlled HTN well controlled. DM w/recent elevated HgbA1C 8.6%. No s/s of infectious process. Recent TSH WNL.  No hx of RA/SLE. Denies trauma.   Upon brief review no potentiating medications noted in current list.   Referred patient to PCP For otoscopic evaluation & potential ENT referral.

## 2024-11-18 ASSESSMENT — ENCOUNTER SYMPTOMS
PALPITATIONS: 1
DIZZINESS: 1

## 2024-12-09 ENCOUNTER — TELEPHONE (OUTPATIENT)
Dept: PRIMARY CARE | Facility: CLINIC | Age: 69
End: 2024-12-09
Payer: MEDICARE

## 2024-12-09 DIAGNOSIS — E11.69 TYPE 2 DIABETES MELLITUS WITH OTHER SPECIFIED COMPLICATION, WITHOUT LONG-TERM CURRENT USE OF INSULIN: ICD-10-CM

## 2024-12-09 DIAGNOSIS — I10 BENIGN ESSENTIAL HYPERTENSION: ICD-10-CM

## 2024-12-09 RX ORDER — LOSARTAN POTASSIUM 100 MG/1
100 TABLET ORAL DAILY
Qty: 90 TABLET | Refills: 1 | Status: SHIPPED | OUTPATIENT
Start: 2024-12-09

## 2024-12-09 RX ORDER — ATORVASTATIN CALCIUM 40 MG/1
40 TABLET, FILM COATED ORAL DAILY
Qty: 90 TABLET | Refills: 0 | Status: SHIPPED | OUTPATIENT
Start: 2024-12-09

## 2024-12-09 NOTE — TELEPHONE ENCOUNTER
Pt requesting refill    Atorvastatin 40mg daily- last refill 9/5/24  Losartan 100mg daily- last refill 6/21/24   [phone]  Last appt 7/8/24  No future appt has been scheduled

## 2024-12-23 ENCOUNTER — TELEPHONE (OUTPATIENT)
Dept: PRIMARY CARE | Facility: CLINIC | Age: 69
End: 2024-12-23
Payer: MEDICARE

## 2024-12-23 DIAGNOSIS — E11.69 TYPE 2 DIABETES MELLITUS WITH OTHER SPECIFIED COMPLICATION, WITHOUT LONG-TERM CURRENT USE OF INSULIN: ICD-10-CM

## 2024-12-23 DIAGNOSIS — M10.9 GOUT, UNSPECIFIED CAUSE, UNSPECIFIED CHRONICITY, UNSPECIFIED SITE: ICD-10-CM

## 2024-12-23 RX ORDER — PIOGLITAZONEHYDROCHLORIDE 30 MG/1
30 TABLET ORAL DAILY
Qty: 90 TABLET | Refills: 0 | Status: SHIPPED | OUTPATIENT
Start: 2024-12-23

## 2024-12-23 RX ORDER — ALLOPURINOL 300 MG/1
300 TABLET ORAL DAILY
Qty: 90 TABLET | Refills: 0 | Status: SHIPPED | OUTPATIENT
Start: 2024-12-23

## 2024-12-23 NOTE — TELEPHONE ENCOUNTER
REFILL  MEDICATION:     Allopurinol 300 MG; Take 1 tablet daily.    Pioglitazone 30 MG; Take 1 tablet daily.    PHARM: [facility]   PHARM NUMBER: [phone]    LR: 9/20/24      90 tablets with 0 refills for both meds   LV: 7/8/24  NV: 1/6/25

## 2025-01-06 ENCOUNTER — APPOINTMENT (OUTPATIENT)
Dept: PRIMARY CARE | Facility: CLINIC | Age: 70
End: 2025-01-06
Payer: MEDICARE

## 2025-01-06 DIAGNOSIS — J20.9 ACUTE BRONCHITIS, UNSPECIFIED ORGANISM: Primary | ICD-10-CM

## 2025-01-06 PROCEDURE — 1160F RVW MEDS BY RX/DR IN RCRD: CPT | Performed by: FAMILY MEDICINE

## 2025-01-06 PROCEDURE — 1159F MED LIST DOCD IN RCRD: CPT | Performed by: FAMILY MEDICINE

## 2025-01-06 PROCEDURE — 1036F TOBACCO NON-USER: CPT | Performed by: FAMILY MEDICINE

## 2025-01-06 PROCEDURE — 4010F ACE/ARB THERAPY RXD/TAKEN: CPT | Performed by: FAMILY MEDICINE

## 2025-01-06 PROCEDURE — 99213 OFFICE O/P EST LOW 20 MIN: CPT | Performed by: FAMILY MEDICINE

## 2025-01-06 PROCEDURE — 1123F ACP DISCUSS/DSCN MKR DOCD: CPT | Performed by: FAMILY MEDICINE

## 2025-01-06 RX ORDER — AZITHROMYCIN 250 MG/1
TABLET, FILM COATED ORAL
Qty: 6 TABLET | Refills: 0 | Status: SHIPPED | OUTPATIENT
Start: 2025-01-06 | End: 2025-01-11

## 2025-01-06 ASSESSMENT — ENCOUNTER SYMPTOMS
DIARRHEA: 1
ABDOMINAL PAIN: 1
NAUSEA: 0
ARTHRALGIAS: 0
HEADACHES: 1
VOMITING: 0
FEVER: 1
BELCHING: 0
HEMATOCHEZIA: 0
CONSTIPATION: 0
ANOREXIA: 0
MYALGIAS: 0
FLATUS: 0
HEMATURIA: 0
DYSURIA: 0
FREQUENCY: 0
WEIGHT LOSS: 0

## 2025-01-06 NOTE — PROGRESS NOTES
Subjective   Patient ID: [id number]   Virtual or Telephone Consent    An interactive audio and video telecommunication system which permits real time communications between the patient (at the originating site) and provider (at the distant site) was utilized to provide this telehealth service.   Verbal consent was requested and obtained from Mary Ellen Gomez on this date, 01/06/25 for a telehealth visit.     Mary Ellen Gomez is a 69 y.o. female who presents for recheck.    Abdominal Pain  This is a new problem. The current episode started more than 1 month ago. The onset quality is gradual. The problem occurs daily. The problem has been unchanged. The pain is located in the suprapubic region. The pain is at a severity of 4/10. The quality of the pain is aching and dull. The abdominal pain does not radiate. Associated symptoms include diarrhea, a fever and headaches. Pertinent negatives include no anorexia, arthralgias, belching, constipation, dysuria, flatus, frequency, hematochezia, hematuria, melena, myalgias, nausea, vomiting or weight loss. Nothing aggravates the pain. The pain is relieved by Bowel movements. Prior diagnostic workup includes GI consult.       She was here for a recheck but she became really sick yesterday.  She switched it to a virtual visit.      She complains of a sore throat, coughing.  She tested negative for covid.  SOB only with bad coughing spells.  She is coughing less today than yesterday.  Nasal congestion.        Objective     There were no vitals taken for this visit.     Physical Exam  Pulmonary:      Effort: Pulmonary effort is normal. No respiratory distress.   Neurological:      General: No focal deficit present.      Mental Status: She is alert and oriented to person, place, and time. Mental status is at baseline.     She states she is still having stomach issues but not so much lately.  She thinks she has a fever  but has not checked her temperature.     Assessment/Plan   Problem  List Items Addressed This Visit    None  Visit Diagnoses       Acute bronchitis, unspecified organism    -  Primary    Relevant Medications    azithromycin (Zithromax) 250 mg tablet        I prescribed antibiotics for your URI.  Return in one week for an in person appointment for a recheck on your abdominal pain, which you state is lessened in the last few days.  Return in one week or sooner if this gets worse.      Ponce Amos, DO

## 2025-01-07 ENCOUNTER — TELEPHONE (OUTPATIENT)
Dept: PRIMARY CARE | Facility: CLINIC | Age: 70
End: 2025-01-07
Payer: MEDICARE

## 2025-01-07 DIAGNOSIS — J20.9 ACUTE BRONCHITIS, UNSPECIFIED ORGANISM: Primary | ICD-10-CM

## 2025-01-07 RX ORDER — BENZONATATE 200 MG/1
200 CAPSULE ORAL 3 TIMES DAILY PRN
Qty: 21 CAPSULE | Refills: 0 | Status: SHIPPED | OUTPATIENT
Start: 2025-01-07 | End: 2025-02-06

## 2025-01-13 ENCOUNTER — APPOINTMENT (OUTPATIENT)
Dept: PRIMARY CARE | Facility: CLINIC | Age: 70
End: 2025-01-13
Payer: MEDICARE

## 2025-01-13 ENCOUNTER — APPOINTMENT (OUTPATIENT)
Dept: RADIOLOGY | Facility: CLINIC | Age: 70
End: 2025-01-13
Payer: MEDICARE

## 2025-01-13 ENCOUNTER — HOSPITAL ENCOUNTER (OUTPATIENT)
Dept: RADIOLOGY | Facility: CLINIC | Age: 70
Discharge: HOME | End: 2025-01-13
Payer: MEDICARE

## 2025-01-13 VITALS
SYSTOLIC BLOOD PRESSURE: 116 MMHG | BODY MASS INDEX: 43.6 KG/M2 | TEMPERATURE: 94.6 F | DIASTOLIC BLOOD PRESSURE: 64 MMHG | WEIGHT: 254 LBS

## 2025-01-13 DIAGNOSIS — R10.9 ABDOMINAL PAIN, UNSPECIFIED ABDOMINAL LOCATION: Primary | ICD-10-CM

## 2025-01-13 DIAGNOSIS — E11.69 TYPE 2 DIABETES MELLITUS WITH OTHER SPECIFIED COMPLICATION, WITHOUT LONG-TERM CURRENT USE OF INSULIN: ICD-10-CM

## 2025-01-13 DIAGNOSIS — J20.9 ACUTE BRONCHITIS, UNSPECIFIED ORGANISM: ICD-10-CM

## 2025-01-13 DIAGNOSIS — I10 BENIGN ESSENTIAL HYPERTENSION: ICD-10-CM

## 2025-01-13 DIAGNOSIS — E03.9 ACQUIRED HYPOTHYROIDISM: ICD-10-CM

## 2025-01-13 LAB
ALBUMIN SERPL BCP-MCNC: 4.2 G/DL (ref 3.4–5)
ALP SERPL-CCNC: 59 U/L (ref 33–136)
ALT SERPL W P-5'-P-CCNC: 37 U/L (ref 7–45)
ANION GAP SERPL CALC-SCNC: 14 MMOL/L (ref 10–20)
AST SERPL W P-5'-P-CCNC: 24 U/L (ref 9–39)
BASOPHILS # BLD AUTO: 0.02 X10*3/UL (ref 0–0.1)
BASOPHILS NFR BLD AUTO: 0.4 %
BILIRUB SERPL-MCNC: 0.8 MG/DL (ref 0–1.2)
BUN SERPL-MCNC: 27 MG/DL (ref 6–23)
CALCIUM SERPL-MCNC: 9.3 MG/DL (ref 8.6–10.3)
CHLORIDE SERPL-SCNC: 106 MMOL/L (ref 98–107)
CHOLEST SERPL-MCNC: 125 MG/DL (ref 0–199)
CHOLESTEROL/HDL RATIO: 3.5
CO2 SERPL-SCNC: 25 MMOL/L (ref 21–32)
CREAT SERPL-MCNC: 0.93 MG/DL (ref 0.5–1.05)
EGFRCR SERPLBLD CKD-EPI 2021: 67 ML/MIN/1.73M*2
EOSINOPHIL # BLD AUTO: 0.01 X10*3/UL (ref 0–0.7)
EOSINOPHIL NFR BLD AUTO: 0.2 %
ERYTHROCYTE [DISTWIDTH] IN BLOOD BY AUTOMATED COUNT: 11.9 % (ref 11.5–14.5)
EST. AVERAGE GLUCOSE BLD GHB EST-MCNC: 212 MG/DL
GLUCOSE SERPL-MCNC: 260 MG/DL (ref 74–99)
HBA1C MFR BLD: 9 %
HCT VFR BLD AUTO: 45.4 % (ref 36–46)
HDLC SERPL-MCNC: 35.7 MG/DL
HGB BLD-MCNC: 14.9 G/DL (ref 12–16)
IMM GRANULOCYTES # BLD AUTO: 0.01 X10*3/UL (ref 0–0.7)
IMM GRANULOCYTES NFR BLD AUTO: 0.2 % (ref 0–0.9)
LDLC SERPL CALC-MCNC: 58 MG/DL
LYMPHOCYTES # BLD AUTO: 1.9 X10*3/UL (ref 1.2–4.8)
LYMPHOCYTES NFR BLD AUTO: 35.6 %
MCH RBC QN AUTO: 31.4 PG (ref 26–34)
MCHC RBC AUTO-ENTMCNC: 32.8 G/DL (ref 32–36)
MCV RBC AUTO: 96 FL (ref 80–100)
MONOCYTES # BLD AUTO: 0.61 X10*3/UL (ref 0.1–1)
MONOCYTES NFR BLD AUTO: 11.4 %
NEUTROPHILS # BLD AUTO: 2.79 X10*3/UL (ref 1.2–7.7)
NEUTROPHILS NFR BLD AUTO: 52.2 %
NON HDL CHOLESTEROL: 89 MG/DL (ref 0–149)
NRBC BLD-RTO: 0 /100 WBCS (ref 0–0)
PLATELET # BLD AUTO: 205 X10*3/UL (ref 150–450)
POC BILIRUBIN, URINE: NEGATIVE
POC BLOOD, URINE: NEGATIVE
POC GLUCOSE, URINE: ABNORMAL MG/DL
POC KETONES, URINE: NEGATIVE MG/DL
POC LEUKOCYTES, URINE: NEGATIVE
POC NITRITE,URINE: NEGATIVE
POC PH, URINE: 5.5 PH
POC PROTEIN, URINE: ABNORMAL MG/DL
POC RAPID INFLUENZA A: NEGATIVE
POC RAPID INFLUENZA B: NEGATIVE
POC SARS-COV-2 AG BINAX: NORMAL
POC SPECIFIC GRAVITY, URINE: 1.02
POC UROBILINOGEN, URINE: 0.2 EU/DL
POTASSIUM SERPL-SCNC: 4.2 MMOL/L (ref 3.5–5.3)
PROT SERPL-MCNC: 6.9 G/DL (ref 6.4–8.2)
RBC # BLD AUTO: 4.74 X10*6/UL (ref 4–5.2)
RSV RNA RESP QL NAA+PROBE: NOT DETECTED
SODIUM SERPL-SCNC: 141 MMOL/L (ref 136–145)
TRIGL SERPL-MCNC: 159 MG/DL (ref 0–149)
TSH SERPL-ACNC: 0.79 MIU/L (ref 0.44–3.98)
VLDL: 32 MG/DL (ref 0–40)
WBC # BLD AUTO: 5.3 X10*3/UL (ref 4.4–11.3)

## 2025-01-13 PROCEDURE — 87804 INFLUENZA ASSAY W/OPTIC: CPT | Performed by: FAMILY MEDICINE

## 2025-01-13 PROCEDURE — 87086 URINE CULTURE/COLONY COUNT: CPT

## 2025-01-13 PROCEDURE — 1160F RVW MEDS BY RX/DR IN RCRD: CPT | Performed by: FAMILY MEDICINE

## 2025-01-13 PROCEDURE — 1036F TOBACCO NON-USER: CPT | Performed by: FAMILY MEDICINE

## 2025-01-13 PROCEDURE — 71046 X-RAY EXAM CHEST 2 VIEWS: CPT

## 2025-01-13 PROCEDURE — 3074F SYST BP LT 130 MM HG: CPT | Performed by: FAMILY MEDICINE

## 2025-01-13 PROCEDURE — 4010F ACE/ARB THERAPY RXD/TAKEN: CPT | Performed by: FAMILY MEDICINE

## 2025-01-13 PROCEDURE — 3078F DIAST BP <80 MM HG: CPT | Performed by: FAMILY MEDICINE

## 2025-01-13 PROCEDURE — 83036 HEMOGLOBIN GLYCOSYLATED A1C: CPT

## 2025-01-13 PROCEDURE — 87634 RSV DNA/RNA AMP PROBE: CPT

## 2025-01-13 PROCEDURE — 81003 URINALYSIS AUTO W/O SCOPE: CPT | Performed by: FAMILY MEDICINE

## 2025-01-13 PROCEDURE — 1123F ACP DISCUSS/DSCN MKR DOCD: CPT | Performed by: FAMILY MEDICINE

## 2025-01-13 PROCEDURE — 80053 COMPREHEN METABOLIC PANEL: CPT

## 2025-01-13 PROCEDURE — 99214 OFFICE O/P EST MOD 30 MIN: CPT | Performed by: FAMILY MEDICINE

## 2025-01-13 PROCEDURE — 1159F MED LIST DOCD IN RCRD: CPT | Performed by: FAMILY MEDICINE

## 2025-01-13 PROCEDURE — 71046 X-RAY EXAM CHEST 2 VIEWS: CPT | Performed by: RADIOLOGY

## 2025-01-13 PROCEDURE — 87811 SARS-COV-2 COVID19 W/OPTIC: CPT | Performed by: FAMILY MEDICINE

## 2025-01-13 PROCEDURE — 84443 ASSAY THYROID STIM HORMONE: CPT

## 2025-01-13 PROCEDURE — 85025 COMPLETE CBC W/AUTO DIFF WBC: CPT

## 2025-01-13 PROCEDURE — 80061 LIPID PANEL: CPT

## 2025-01-13 RX ORDER — LEVOFLOXACIN 500 MG/1
500 TABLET, FILM COATED ORAL DAILY
Qty: 10 TABLET | Refills: 0 | Status: SHIPPED | OUTPATIENT
Start: 2025-01-13 | End: 2025-01-23

## 2025-01-13 RX ORDER — ALBUTEROL SULFATE 90 UG/1
2 INHALANT RESPIRATORY (INHALATION) EVERY 4 HOURS PRN
Qty: 8.5 G | Refills: 0 | Status: SHIPPED | OUTPATIENT
Start: 2025-01-13 | End: 2026-01-13

## 2025-01-13 ASSESSMENT — ENCOUNTER SYMPTOMS
SHORTNESS OF BREATH: 1
RHINORRHEA: 1
FATIGUE: 1
COUGH: 1
FEVER: 1
ABDOMINAL PAIN: 1
SORE THROAT: 1
NAUSEA: 1
VOMITING: 0
DYSURIA: 0
DIARRHEA: 1
HEMATURIA: 0
CONSTIPATION: 0
FREQUENCY: 1
DIZZINESS: 1

## 2025-01-13 NOTE — PROGRESS NOTES
Subjective   Patient ID: [id number]     Mary Ellen Gomez is a 69 y.o. female who presents for Follow-up (From virtual appointment.) and Cough.  HPI  She was seen virtually on 1/6/25 for abdominal pain.  The notes from 1/6/25 were reviewed.      She had an URI last time, which lead her to change her 1/6/25 for abdominal pain to virtual.      She has had the abdominal pain for three weeks.      She is still having coughing.  Her chest is congested.      Has nausea and diarrhea.  No constipation.  No fever now but maybe last week.     Review of Systems   Constitutional:  Positive for fatigue (from coughing through the night.) and fever (maybe last week but none now.).   HENT:  Positive for rhinorrhea (has gone away) and sore throat (has gone away.).    Respiratory:  Positive for cough and shortness of breath (with coughing a lot.).    Cardiovascular:  Negative for chest pain.   Gastrointestinal:  Positive for abdominal pain (pain is more suprapubic.), diarrhea and nausea. Negative for constipation and vomiting.   Genitourinary:  Positive for frequency. Negative for dysuria, hematuria, pelvic pain, vaginal bleeding and vaginal discharge.   Neurological:  Positive for dizziness (not eating much.).         Objective     /64 (BP Location: Right arm, Patient Position: Sitting)   Temp 34.8 °C (94.6 °F) (Skin)   Wt 115 kg (254 lb)   BMI 43.60 kg/m²      Physical Exam  Constitutional:       Appearance: Normal appearance.   Cardiovascular:      Rate and Rhythm: Normal rate and regular rhythm.      Heart sounds: Normal heart sounds. No murmur heard.  Pulmonary:      Effort: Pulmonary effort is normal. No respiratory distress.      Breath sounds: Wheezing and rhonchi present.   Abdominal:      General: Abdomen is flat.      Palpations: Abdomen is soft.      Tenderness: There is abdominal tenderness. There is no guarding or rebound.   Neurological:      General: No focal deficit present.      Mental Status: She is alert and  oriented to person, place, and time.         Assessment/Plan   Problem List Items Addressed This Visit       Benign essential hypertension    Relevant Orders    Comprehensive Metabolic Panel    Diabetes mellitus (Multi)    Relevant Orders    Hemoglobin A1C    Lipid Panel    Acquired hypothyroidism    Relevant Orders    Thyroid Stimulating Hormone     Other Visit Diagnoses       Abdominal pain, unspecified abdominal location    -  Primary    Relevant Orders    POCT UA Automated manually resulted (Completed)    CBC and Auto Differential    Urine Culture    Acute bronchitis, unspecified organism        Relevant Orders    POCT BinaxNOW Covid-19 Ag Card manually resulted (Completed)    POCT Influenza A/B manually resulted (Completed)    CBC and Auto Differential    RSV PCR        Your flu and covid testing were negative.  The RSV test is still pending.    I will order some antibiotics and an inhaler for your wheezing and coughing.  I will order a chest xray.    I will order a urine culture to rule out a UTI.      Return in one week if the abdominal pain, coughing or wheezing persist.      I recommend that you continue to work at home.  This could potentially be contagious to others.      Ponce Amos, DO

## 2025-01-13 NOTE — PATIENT INSTRUCTIONS
Your flu and covid testing were negative.  The RSV test is still pending.    I will order some antibiotics and an inhaler for your wheezing and coughing.  I will order a chest xray.    I will order a urine culture to rule out a UTI.      Return in one week if the abdominal pain, coughing or wheezing persist.      I recommend that you continue to work at home.  This could potentially be contagious to others.

## 2025-01-14 LAB — BACTERIA UR CULT: NORMAL

## 2025-01-26 DIAGNOSIS — E11.69 TYPE 2 DIABETES MELLITUS WITH OTHER SPECIFIED COMPLICATION, WITHOUT LONG-TERM CURRENT USE OF INSULIN: Primary | ICD-10-CM

## 2025-01-26 RX ORDER — DULAGLUTIDE 1.5 MG/.5ML
1.5 INJECTION, SOLUTION SUBCUTANEOUS WEEKLY
Qty: 2 ML | Refills: 3 | Status: SHIPPED | OUTPATIENT
Start: 2025-01-26 | End: 2025-01-27 | Stop reason: DRUGHIGH

## 2025-01-27 ENCOUNTER — TELEPHONE (OUTPATIENT)
Dept: PRIMARY CARE | Facility: CLINIC | Age: 70
End: 2025-01-27
Payer: MEDICARE

## 2025-01-27 DIAGNOSIS — E11.69 TYPE 2 DIABETES MELLITUS WITH OTHER SPECIFIED COMPLICATION, WITHOUT LONG-TERM CURRENT USE OF INSULIN: Primary | ICD-10-CM

## 2025-01-27 DIAGNOSIS — J20.9 ACUTE BRONCHITIS, UNSPECIFIED ORGANISM: ICD-10-CM

## 2025-01-27 RX ORDER — DULAGLUTIDE 0.75 MG/.5ML
0.75 INJECTION, SOLUTION SUBCUTANEOUS WEEKLY
Qty: 2 ML | Refills: 3 | Status: SHIPPED | OUTPATIENT
Start: 2025-01-27

## 2025-01-27 RX ORDER — LEVOFLOXACIN 500 MG/1
500 TABLET, FILM COATED ORAL DAILY
Qty: 10 TABLET | Refills: 0 | Status: SHIPPED | OUTPATIENT
Start: 2025-01-27 | End: 2025-02-06

## 2025-01-27 NOTE — TELEPHONE ENCOUNTER
Ponce Amos, DO to You    Ok.  I called in the lower dose of Trulicity 0.75 mg weekly.  And I called in another round of antibiotics, Levaquin.

## 2025-01-27 NOTE — TELEPHONE ENCOUNTER
Results and recommendations discussed with patient.  She states that she stopped Trulicity due to being unable to tolerate GI symptoms.  She said she would be willing to try it again at the lower dosage. Please advise.    Also, patient states that she completed 10 day course of antibiotic and is starting to feel sick again.  She is asking if you could send in another round of antibiotics?  Please advise.  Thanks

## 2025-01-27 NOTE — TELEPHONE ENCOUNTER
----- Message from Ponce mAos sent at 1/26/2025  7:56 PM EST -----  Please let her know her A1c was 9.  This indicates that her diabetes is not well controlled.  I called in a higher dose of Trulicity.  I increased her Trulicity from 0.75 mg weekly to 1.5 mg weekly.  She should decrease sugars and starches in the diet and increase exercise.

## 2025-01-31 ENCOUNTER — TELEPHONE (OUTPATIENT)
Dept: PRIMARY CARE | Facility: CLINIC | Age: 70
End: 2025-01-31
Payer: MEDICARE

## 2025-01-31 DIAGNOSIS — E11.42 DIABETIC POLYNEUROPATHY ASSOCIATED WITH TYPE 2 DIABETES MELLITUS (MULTI): ICD-10-CM

## 2025-01-31 DIAGNOSIS — E11.69 TYPE 2 DIABETES MELLITUS WITH OTHER SPECIFIED COMPLICATION, WITHOUT LONG-TERM CURRENT USE OF INSULIN: ICD-10-CM

## 2025-01-31 RX ORDER — GLIPIZIDE 10 MG/1
10 TABLET, FILM COATED, EXTENDED RELEASE ORAL 2 TIMES DAILY
Qty: 180 TABLET | Refills: 1 | Status: SHIPPED | OUTPATIENT
Start: 2025-01-31

## 2025-01-31 RX ORDER — GABAPENTIN 300 MG/1
600 CAPSULE ORAL NIGHTLY
Qty: 90 CAPSULE | Refills: 1 | Status: SHIPPED | OUTPATIENT
Start: 2025-01-31

## 2025-01-31 NOTE — TELEPHONE ENCOUNTER
REFILL  MEDICATION:     Glipizide XL 10 MG; Take 1 tablet 2 times a day.      LR: 7/8/24      180 tablets with 1 refill     Gabapentin 300 MG; Take 2 capsules once daily at bedtime.     LR: 10/21/24      90 capsules with 0 refills     [facility]: [facility] NUMBER: [phone]    LV: 1/13/25  NV: No future appt.

## 2025-02-21 ENCOUNTER — TELEPHONE (OUTPATIENT)
Dept: PRIMARY CARE | Facility: CLINIC | Age: 70
End: 2025-02-21
Payer: MEDICARE

## 2025-02-21 DIAGNOSIS — K58.9 IRRITABLE BOWEL SYNDROME, UNSPECIFIED TYPE: ICD-10-CM

## 2025-02-21 RX ORDER — DICYCLOMINE HYDROCHLORIDE 10 MG/1
10 CAPSULE ORAL 3 TIMES DAILY
Qty: 90 CAPSULE | Refills: 1 | Status: SHIPPED | OUTPATIENT
Start: 2025-02-21

## 2025-02-21 NOTE — TELEPHONE ENCOUNTER
REFILL  MEDICATION:     Dicyclomine 10 MG; Take 1 capsule 3 times a day.     PHARM: [facility]   PHARM NUMBER: [phone]    LR: 7/22/24      90 capsules with 1 refill   LV: 1/13/25  NV: No future appt.

## 2025-03-03 ASSESSMENT — DERMATOLOGY QUALITY OF LIFE (QOL) ASSESSMENT
WHAT SINGLE SKIN CONDITION LISTED BELOW IS THE PATIENT ANSWERING THE QUALITY-OF-LIFE ASSESSMENT QUESTIONS ABOUT: NONE OF THE ABOVE
RATE HOW BOTHERED YOU ARE BY SYMPTOMS OF YOUR SKIN PROBLEM (EG, ITCHING, STINGING BURNING, HURTING OR SKIN IRRITATION): 3
RATE HOW BOTHERED YOU ARE BY SYMPTOMS OF YOUR SKIN PROBLEM (EG, ITCHING, STINGING BURNING, HURTING OR SKIN IRRITATION): 3
RATE HOW EMOTIONALLY BOTHERED YOU ARE BY YOUR SKIN PROBLEM (FOR EXAMPLE, WORRY, EMBARRASSMENT, FRUSTRATION): 0 - NEVER BOTHERED
RATE HOW BOTHERED YOU ARE BY EFFECTS OF YOUR SKIN PROBLEMS ON YOUR ACTIVITIES (EG, GOING OUT, ACCOMPLISHING WHAT YOU WANT, WORK ACTIVITIES OR YOUR RELATIONSHIPS WITH OTHERS): 0 - NEVER BOTHERED
WHAT SINGLE SKIN CONDITION LISTED BELOW IS THE PATIENT ANSWERING THE QUALITY-OF-LIFE ASSESSMENT QUESTIONS ABOUT: NONE OF THE ABOVE
RATE HOW EMOTIONALLY BOTHERED YOU ARE BY YOUR SKIN PROBLEM (FOR EXAMPLE, WORRY, EMBARRASSMENT, FRUSTRATION): 0 - NEVER BOTHERED
RATE HOW BOTHERED YOU ARE BY EFFECTS OF YOUR SKIN PROBLEMS ON YOUR ACTIVITIES (EG, GOING OUT, ACCOMPLISHING WHAT YOU WANT, WORK ACTIVITIES OR YOUR RELATIONSHIPS WITH OTHERS): 0 - NEVER BOTHERED

## 2025-03-03 ASSESSMENT — PATIENT GLOBAL ASSESSMENT (PGA): WHAT IS THE PGA: PATIENT GLOBAL ASSESSMENT:  2 - MILD

## 2025-03-04 ENCOUNTER — APPOINTMENT (OUTPATIENT)
Dept: DERMATOLOGY | Facility: CLINIC | Age: 70
End: 2025-03-04
Payer: MEDICARE

## 2025-03-04 DIAGNOSIS — L81.4 LENTIGO: ICD-10-CM

## 2025-03-04 DIAGNOSIS — Z12.83 ENCOUNTER FOR SCREENING FOR MALIGNANT NEOPLASM OF SKIN: ICD-10-CM

## 2025-03-04 DIAGNOSIS — D18.01 HEMANGIOMA OF SKIN: ICD-10-CM

## 2025-03-04 DIAGNOSIS — D48.5 NEOPLASM OF UNCERTAIN BEHAVIOR OF SKIN: ICD-10-CM

## 2025-03-04 DIAGNOSIS — L57.8 PHOTOAGING OF SKIN: Primary | ICD-10-CM

## 2025-03-04 DIAGNOSIS — L82.1 SEBORRHEIC KERATOSIS: ICD-10-CM

## 2025-03-04 DIAGNOSIS — D48.9 NEOPLASM OF UNCERTAIN BEHAVIOR: ICD-10-CM

## 2025-03-04 DIAGNOSIS — D22.5 MELANOCYTIC NEVI OF TRUNK: ICD-10-CM

## 2025-03-04 PROCEDURE — 4010F ACE/ARB THERAPY RXD/TAKEN: CPT | Performed by: DERMATOLOGY

## 2025-03-04 PROCEDURE — 1159F MED LIST DOCD IN RCRD: CPT | Performed by: DERMATOLOGY

## 2025-03-04 PROCEDURE — 11102 TANGNTL BX SKIN SINGLE LES: CPT | Performed by: DERMATOLOGY

## 2025-03-04 PROCEDURE — 1123F ACP DISCUSS/DSCN MKR DOCD: CPT | Performed by: DERMATOLOGY

## 2025-03-04 PROCEDURE — 11103 TANGNTL BX SKIN EA SEP/ADDL: CPT | Performed by: DERMATOLOGY

## 2025-03-04 PROCEDURE — 1036F TOBACCO NON-USER: CPT | Performed by: DERMATOLOGY

## 2025-03-04 PROCEDURE — 3052F HG A1C>EQUAL 8.0%<EQUAL 9.0%: CPT | Performed by: DERMATOLOGY

## 2025-03-04 PROCEDURE — 99203 OFFICE O/P NEW LOW 30 MIN: CPT | Performed by: DERMATOLOGY

## 2025-03-04 PROCEDURE — 3048F LDL-C <100 MG/DL: CPT | Performed by: DERMATOLOGY

## 2025-03-04 NOTE — LETTER
March 4, 2025     Ponce Amos DO  [address] [facility] [address]  [address]  [facility] [address]    Patient: Mary Ellen Gomez   YOB: 1955   Date of Visit: 3/4/2025       Dear Dr. Ponce Amos DO:    Thank you for referring Mary Ellen Gomez to me for evaluation. Below are my notes for this consultation.  If you have questions, please do not hesitate to call me. I look forward to following your patient along with you.       Sincerely,     Vicenta Rodriguez DO      CC: No Recipients  ______________________________________________________________________________________    Subjective    Mary Ellen Gomez is a 69 y.o. female who presents for the following: Skin Check (Pt here for FBSE. Lesion on forehead x3 months. No hx of skin cancer.). Reports similar lesion in hairline and lesion in oral mucosa, does follow with dentist but cancelled last appt and due for check up    Review of Systems:  No other skin or systemic complaints other than what is documented elsewhere in the note.    The following portions of the chart were reviewed this encounter and updated as appropriate:         Skin Cancer History  No skin cancer on file.      Specialty Problems          Dermatology Problems    Acne vulgaris    Hemangioma of skin and subcutaneous tissue    Melanocytic nevi of trunk    Melanocytic nevi of unspecified upper limb, including shoulder    Other hypertrophic disorders of the skin    Other melanin hyperpigmentation    Other seborrheic keratosis    Rash and other nonspecific skin eruption    Seborrheic keratosis    Dysplastic nevus        Objective  Well appearing patient in no apparent distress; mood and affect are within normal limits.    A full examination was performed including scalp, head, eyes, ears, nose, lips, neck, chest, axillae, abdomen, back, buttocks, bilateral upper extremities, bilateral lower extremities, hands, feet, fingers, toes, fingernails, and toenails. Declined examination of genitals, does see  provider for routine pelvic examinations. All findings within normal limits unless otherwise noted below.    Assessment/Plan  1. Photoaging of skin  Mottled pigmentation with telangiectasias and brown reticular macules in sun exposed areas of the body.    The risk of chronic, cumulative sun damage and risk of development of skin cancer was reviewed today.   The importance of sun protection was reviewed: including the use of a broad spectrum sunscreen of at least SPF 30 that protects against both UVA/UVB rays, with ingredients such as Zinc oxide or titanium dioxide, wearing sun protective clothing and sun avoidance. We reviewed the warning signs of non-melanoma skin cancer and ABCDEs of melanoma  Please follow up should you notice any new or changing pre-existing skin lesion.    2. Neoplasm of uncertain behavior of skin (2)  Mid Forehead  1.2cm eroded crusted plaque          Lesion biopsy  Type of biopsy: tangential    Informed consent: discussed and consent obtained    Timeout: patient name, date of birth, surgical site, and procedure verified    Procedure prep:  Patient was prepped and draped  Anesthesia: the lesion was anesthetized in a standard fashion    Anesthetic:  1% lidocaine w/ epinephrine 1-100,000 local infiltration  Instrument used: DermaBlade    Hemostasis achieved with: electrodesiccation    Outcome: patient tolerated procedure well    Post-procedure details: sterile dressing applied and wound care instructions given    Dressing type: petrolatum and bandage      Staff Communication: Dermatology Local Anesthesia: 1 % Lidocaine / Epinephrine - Amount:2cc    Specimen 1 - Dermatopathology- DERM LAB  Differential Diagnosis: bcc  Check Margins Yes/No?:    Comments:    Dermpath Lab: Routine Histopathology (formalin-fixed tissue)    Left temporal hairline  1.1cm crusted pearly plaque          Lesion biopsy  Type of biopsy: tangential    Informed consent: discussed and consent obtained    Timeout: patient name,  date of birth, surgical site, and procedure verified    Procedure prep:  Patient was prepped and draped  Anesthesia: the lesion was anesthetized in a standard fashion    Anesthetic:  1% lidocaine w/ epinephrine 1-100,000 local infiltration  Instrument used: DermaBlade    Hemostasis achieved with: aluminum chloride    Outcome: patient tolerated procedure well    Post-procedure details: sterile dressing applied and wound care instructions given    Dressing type: petrolatum and bandage      Staff Communication: Dermatology Local Anesthesia: 1 % Lidocaine / Epinephrine - Amount:2cc    Specimen 2 - Dermatopathology- DERM LAB  Differential Diagnosis: bcc  Check Margins Yes/No?:    Comments:    Dermpath Lab: Routine Histopathology (formalin-fixed tissue)    Lesions concerning for bcc. The need for biopsy for definitive diagnosis recommended. Risks and benefits reviewed, see procedure note.    3. Neoplasm of uncertain behavior  Right Palatal Mucosa  Pink papule without ulceration or erosion    Lesion of oral mucosa   Advised to follow up with dentist for further evaluation and may refer to oral surgeon at their discretion    4. Hemangioma of skin  Cherry red papules    The benign nature of these skin lesions were reviewed, no treatment is necessary.   Please follow up for any new or pre-existing lesion that is changing in size, shape, color, becomes painful, tender, itches or bleed.    5. Lentigo  Scattered tan macules in sun-exposed areas.    These are benign skin lesions due to sun exposure. They will darken in response to sun exposure. They should be monitored for change in size, shape or color.  These lesions can be treated cosmetically with topical creams, liquid nitrogen and a variety of lasers.    6. Seborrheic keratosis  Brown, tan waxy macules and stuck on appearing papules and plaques    The benign nature of these skin lesions reviewed, reassure provided and no further treatment needed at this time.   These  lesions can be removed, if symptomatic (itching, bleeding, rubbing on clothing, painful), otherwise removal is considered cosmetic.     7. Melanocytic nevi of trunk  Torso - Posterior (Back)  Tan-brown symmetric macules and papules    Clinically benign appearing nevi, no treatment is necessary.  The importance of sun protection was reviewed: including the use of a broad spectrum sunscreen that protects against both UVA/UVB rays, with ingredients such as Zinc oxide or titanium dioxide, wearing sun protective clothing and sun avoidance.   ABCDEs of melanoma reviewed.  Please follow up should you notice any new or changing pre-existing skin lesion.    8. Encounter for screening for malignant neoplasm of skin  2 lesions concerning for BCC on the face today (see NUB)    The risk of chronic, cumulative sun damage and risk of development of skin cancer was reviewed today.   The importance of sun protection was reviewed: including the use of a broad spectrum sunscreen of at least SPF 30 that protects against both UVA/UVB rays, with ingredients such as Zinc oxide or titanium dioxide, wearing sun protective clothing and sun avoidance. We reviewed the warning signs of non-melanoma skin cancer and ABCDEs of melanoma  Please follow up should you notice any new or changing pre-existing skin lesion.      Follow up in 6 months for FBSE

## 2025-03-04 NOTE — PROGRESS NOTES
Subjective     Mary Ellen Gomez is a 69 y.o. female who presents for the following: Skin Check (Pt here for FBSE. Lesion on forehead x3 months. No hx of skin cancer.). Reports similar lesion in hairline and lesion in oral mucosa, does follow with dentist but cancelled last appt and due for check up    Review of Systems:  No other skin or systemic complaints other than what is documented elsewhere in the note.    The following portions of the chart were reviewed this encounter and updated as appropriate:         Skin Cancer History  No skin cancer on file.      Specialty Problems          Dermatology Problems    Acne vulgaris    Hemangioma of skin and subcutaneous tissue    Melanocytic nevi of trunk    Melanocytic nevi of unspecified upper limb, including shoulder    Other hypertrophic disorders of the skin    Other melanin hyperpigmentation    Other seborrheic keratosis    Rash and other nonspecific skin eruption    Seborrheic keratosis    Dysplastic nevus        Objective   Well appearing patient in no apparent distress; mood and affect are within normal limits.    A full examination was performed including scalp, head, eyes, ears, nose, lips, neck, chest, axillae, abdomen, back, buttocks, bilateral upper extremities, bilateral lower extremities, hands, feet, fingers, toes, fingernails, and toenails. Declined examination of genitals, does see provider for routine pelvic examinations. All findings within normal limits unless otherwise noted below.    Assessment/Plan   1. Photoaging of skin  Mottled pigmentation with telangiectasias and brown reticular macules in sun exposed areas of the body.    The risk of chronic, cumulative sun damage and risk of development of skin cancer was reviewed today.   The importance of sun protection was reviewed: including the use of a broad spectrum sunscreen of at least SPF 30 that protects against both UVA/UVB rays, with ingredients such as Zinc oxide or titanium dioxide, wearing sun  protective clothing and sun avoidance. We reviewed the warning signs of non-melanoma skin cancer and ABCDEs of melanoma  Please follow up should you notice any new or changing pre-existing skin lesion.    2. Neoplasm of uncertain behavior of skin (2)  Mid Forehead  1.2cm eroded crusted plaque          Lesion biopsy  Type of biopsy: tangential    Informed consent: discussed and consent obtained    Timeout: patient name, date of birth, surgical site, and procedure verified    Procedure prep:  Patient was prepped and draped  Anesthesia: the lesion was anesthetized in a standard fashion    Anesthetic:  1% lidocaine w/ epinephrine 1-100,000 local infiltration  Instrument used: DermaBlade    Hemostasis achieved with: electrodesiccation    Outcome: patient tolerated procedure well    Post-procedure details: sterile dressing applied and wound care instructions given    Dressing type: petrolatum and bandage      Staff Communication: Dermatology Local Anesthesia: 1 % Lidocaine / Epinephrine - Amount:2cc    Specimen 1 - Dermatopathology- DERM LAB  Differential Diagnosis: bcc  Check Margins Yes/No?:    Comments:    Dermpath Lab: Routine Histopathology (formalin-fixed tissue)    Left temporal hairline  1.1cm crusted pearly plaque          Lesion biopsy  Type of biopsy: tangential    Informed consent: discussed and consent obtained    Timeout: patient name, date of birth, surgical site, and procedure verified    Procedure prep:  Patient was prepped and draped  Anesthesia: the lesion was anesthetized in a standard fashion    Anesthetic:  1% lidocaine w/ epinephrine 1-100,000 local infiltration  Instrument used: DermaBlade    Hemostasis achieved with: aluminum chloride    Outcome: patient tolerated procedure well    Post-procedure details: sterile dressing applied and wound care instructions given    Dressing type: petrolatum and bandage      Staff Communication: Dermatology Local Anesthesia: 1 % Lidocaine / Epinephrine -  Amount:2cc    Specimen 2 - Dermatopathology- DERM LAB  Differential Diagnosis: bcc  Check Margins Yes/No?:    Comments:    Dermpath Lab: Routine Histopathology (formalin-fixed tissue)    Lesions concerning for bcc. The need for biopsy for definitive diagnosis recommended. Risks and benefits reviewed, see procedure note.    3. Neoplasm of uncertain behavior  Right Palatal Mucosa  Pink papule without ulceration or erosion    Lesion of oral mucosa   Advised to follow up with dentist for further evaluation and may refer to oral surgeon at their discretion    4. Hemangioma of skin  Cherry red papules    The benign nature of these skin lesions were reviewed, no treatment is necessary.   Please follow up for any new or pre-existing lesion that is changing in size, shape, color, becomes painful, tender, itches or bleed.    5. Lentigo  Scattered tan macules in sun-exposed areas.    These are benign skin lesions due to sun exposure. They will darken in response to sun exposure. They should be monitored for change in size, shape or color.  These lesions can be treated cosmetically with topical creams, liquid nitrogen and a variety of lasers.    6. Seborrheic keratosis  Brown, tan waxy macules and stuck on appearing papules and plaques    The benign nature of these skin lesions reviewed, reassure provided and no further treatment needed at this time.   These lesions can be removed, if symptomatic (itching, bleeding, rubbing on clothing, painful), otherwise removal is considered cosmetic.     7. Melanocytic nevi of trunk  Torso - Posterior (Back)  Tan-brown symmetric macules and papules    Clinically benign appearing nevi, no treatment is necessary.  The importance of sun protection was reviewed: including the use of a broad spectrum sunscreen that protects against both UVA/UVB rays, with ingredients such as Zinc oxide or titanium dioxide, wearing sun protective clothing and sun avoidance.   ABCDEs of melanoma reviewed.  Please  follow up should you notice any new or changing pre-existing skin lesion.    8. Encounter for screening for malignant neoplasm of skin  2 lesions concerning for BCC on the face today (see NUB)    The risk of chronic, cumulative sun damage and risk of development of skin cancer was reviewed today.   The importance of sun protection was reviewed: including the use of a broad spectrum sunscreen of at least SPF 30 that protects against both UVA/UVB rays, with ingredients such as Zinc oxide or titanium dioxide, wearing sun protective clothing and sun avoidance. We reviewed the warning signs of non-melanoma skin cancer and ABCDEs of melanoma  Please follow up should you notice any new or changing pre-existing skin lesion.      Follow up in 6 months for FBSE

## 2025-03-06 LAB
LABORATORY COMMENT REPORT: NORMAL
PATH REPORT.FINAL DX SPEC: NORMAL
PATH REPORT.GROSS SPEC: NORMAL
PATH REPORT.MICROSCOPIC SPEC OTHER STN: NORMAL
PATH REPORT.RELEVANT HX SPEC: NORMAL
PATH REPORT.TOTAL CANCER: NORMAL

## 2025-03-07 DIAGNOSIS — C44.310 BCC (BASAL CELL CARCINOMA), FACE: Primary | ICD-10-CM

## 2025-03-11 ENCOUNTER — APPOINTMENT (OUTPATIENT)
Dept: DERMATOLOGY | Facility: CLINIC | Age: 70
End: 2025-03-11
Payer: MEDICARE

## 2025-03-11 ENCOUNTER — TELEPHONE (OUTPATIENT)
Dept: PRIMARY CARE | Facility: CLINIC | Age: 70
End: 2025-03-11

## 2025-03-11 DIAGNOSIS — E11.69 TYPE 2 DIABETES MELLITUS WITH OTHER SPECIFIED COMPLICATION, WITHOUT LONG-TERM CURRENT USE OF INSULIN: ICD-10-CM

## 2025-03-11 NOTE — TELEPHONE ENCOUNTER
Can wait till  is back in the office.     REFILL  MEDICATION:     Atorvastatin 40 MG; Take 1 tablet daily.     PHARM: [facility]   PHARM NUMBER: [phone]    LR: 12/9/24       90 tablets with 0 refills   LV: 1/13/25  NV: 7/7/25

## 2025-03-12 RX ORDER — ATORVASTATIN CALCIUM 40 MG/1
40 TABLET, FILM COATED ORAL DAILY
Qty: 90 TABLET | Refills: 0 | Status: SHIPPED | OUTPATIENT
Start: 2025-03-12

## 2025-03-31 ENCOUNTER — TELEPHONE (OUTPATIENT)
Dept: PRIMARY CARE | Facility: CLINIC | Age: 70
End: 2025-03-31
Payer: MEDICARE

## 2025-03-31 DIAGNOSIS — E11.69 TYPE 2 DIABETES MELLITUS WITH OTHER SPECIFIED COMPLICATION, WITHOUT LONG-TERM CURRENT USE OF INSULIN: ICD-10-CM

## 2025-03-31 DIAGNOSIS — M10.9 GOUT, UNSPECIFIED CAUSE, UNSPECIFIED CHRONICITY, UNSPECIFIED SITE: ICD-10-CM

## 2025-03-31 RX ORDER — ALLOPURINOL 300 MG/1
300 TABLET ORAL DAILY
Qty: 90 TABLET | Refills: 0 | Status: SHIPPED | OUTPATIENT
Start: 2025-03-31

## 2025-03-31 RX ORDER — PIOGLITAZONEHYDROCHLORIDE 30 MG/1
30 TABLET ORAL DAILY
Qty: 90 TABLET | Refills: 0 | Status: SHIPPED | OUTPATIENT
Start: 2025-03-31

## 2025-03-31 NOTE — TELEPHONE ENCOUNTER
REFILL  MEDICATION:     Pioglitazone 30 MG; Take 1 tablet daily.     Allopurinol 300 MG; Take 1 tablet daily.    [facility]: [facility]   PHARM NUMBER: [phone]    LR: 12/23/24      90 tablets with 0 refills for both meds   LV: 1/13/25  NV: 7/7/25

## 2025-04-04 ENCOUNTER — OFFICE VISIT (OUTPATIENT)
Dept: PRIMARY CARE | Facility: CLINIC | Age: 70
End: 2025-04-04
Payer: MEDICARE

## 2025-04-04 VITALS
BODY MASS INDEX: 45.83 KG/M2 | WEIGHT: 267 LBS | DIASTOLIC BLOOD PRESSURE: 84 MMHG | TEMPERATURE: 97.9 F | SYSTOLIC BLOOD PRESSURE: 124 MMHG

## 2025-04-04 DIAGNOSIS — H61.21 CERUMEN DEBRIS ON TYMPANIC MEMBRANE OF RIGHT EAR: Primary | ICD-10-CM

## 2025-04-04 PROCEDURE — 3074F SYST BP LT 130 MM HG: CPT | Performed by: FAMILY MEDICINE

## 2025-04-04 PROCEDURE — 1036F TOBACCO NON-USER: CPT | Performed by: FAMILY MEDICINE

## 2025-04-04 PROCEDURE — 3048F LDL-C <100 MG/DL: CPT | Performed by: FAMILY MEDICINE

## 2025-04-04 PROCEDURE — 3079F DIAST BP 80-89 MM HG: CPT | Performed by: FAMILY MEDICINE

## 2025-04-04 PROCEDURE — 1160F RVW MEDS BY RX/DR IN RCRD: CPT | Performed by: FAMILY MEDICINE

## 2025-04-04 PROCEDURE — 1123F ACP DISCUSS/DSCN MKR DOCD: CPT | Performed by: FAMILY MEDICINE

## 2025-04-04 PROCEDURE — 99213 OFFICE O/P EST LOW 20 MIN: CPT | Performed by: FAMILY MEDICINE

## 2025-04-04 PROCEDURE — 3052F HG A1C>EQUAL 8.0%<EQUAL 9.0%: CPT | Performed by: FAMILY MEDICINE

## 2025-04-04 PROCEDURE — 1159F MED LIST DOCD IN RCRD: CPT | Performed by: FAMILY MEDICINE

## 2025-04-04 PROCEDURE — 4010F ACE/ARB THERAPY RXD/TAKEN: CPT | Performed by: FAMILY MEDICINE

## 2025-04-04 ASSESSMENT — PATIENT HEALTH QUESTIONNAIRE - PHQ9
2. FEELING DOWN, DEPRESSED OR HOPELESS: NOT AT ALL
SUM OF ALL RESPONSES TO PHQ9 QUESTIONS 1 AND 2: 0
1. LITTLE INTEREST OR PLEASURE IN DOING THINGS: NOT AT ALL

## 2025-04-04 NOTE — PROGRESS NOTES
Subjective   Patient ID: [id number]     Mary Ellen Gomez is a 69 y.o. female who presents for Ear Fullness (Both ears).  HPI  She complains of left ear fullness in both ears.  She has had it for four months.  She has pain on and off.      She has some loss of hearing.    No drainage from the ears.  No vertigo.        Objective     /84 (BP Location: Left arm, Patient Position: Sitting)   Temp 36.6 °C (97.9 °F) (Skin)   Wt 121 kg (267 lb)   BMI 45.83 kg/m²      Physical Exam  Constitutional:       General: She is not in acute distress.     Appearance: She is not ill-appearing or toxic-appearing.   HENT:      Ears:      Comments: Left EAC clear.  TM clear.  No wax seen.  Nontender.  No drainage, edema or infection    Right ear has some obstructing wax, approx 40% full.  TM partially visible and clear.  No tenderness, edema or drainage.      Neurological:      Mental Status: She is alert.     After irrigation, she feels less clogged. Wax is removed.      Assessment/Plan   Problem List Items Addressed This Visit    None  Visit Diagnoses       Cerumen debris on tympanic membrane of right ear    -  Primary          You had some improvement in your symptoms after irrigation of the ears.  Return if this worsens.   Ponce Amos, DO

## 2025-04-04 NOTE — PATIENT INSTRUCTIONS
You had some improvement in your symptoms after irrigation of the ears.  Return if this worsens.

## 2025-04-08 ENCOUNTER — TELEPHONE (OUTPATIENT)
Dept: PRIMARY CARE | Facility: CLINIC | Age: 70
End: 2025-04-08

## 2025-04-08 ENCOUNTER — APPOINTMENT (OUTPATIENT)
Dept: DERMATOLOGY | Facility: CLINIC | Age: 70
End: 2025-04-08
Payer: MEDICARE

## 2025-04-08 DIAGNOSIS — E03.9 ACQUIRED HYPOTHYROIDISM: ICD-10-CM

## 2025-04-08 RX ORDER — LEVOTHYROXINE SODIUM 137 UG/1
137 TABLET ORAL DAILY
Qty: 90 TABLET | Refills: 1 | Status: SHIPPED | OUTPATIENT
Start: 2025-04-08

## 2025-04-08 NOTE — TELEPHONE ENCOUNTER
REFILL  MEDICATION:     Levothyroxine 137 MCG; Take 1 tablet daily.     PHARM: [facility]   PHARM NUMBER: [phone]    LR: 7/22/24       90 tablets with 1 refill   LV: 4/4/25  NV: 7/7/25

## 2025-04-09 ENCOUNTER — APPOINTMENT (OUTPATIENT)
Dept: DERMATOLOGY | Facility: CLINIC | Age: 70
End: 2025-04-09
Payer: MEDICARE

## 2025-04-09 VITALS — DIASTOLIC BLOOD PRESSURE: 99 MMHG | SYSTOLIC BLOOD PRESSURE: 144 MMHG | HEART RATE: 80 BPM

## 2025-04-09 DIAGNOSIS — C44.319 BASAL CELL CARCINOMA (BCC) OF SKIN OF OTHER PART OF FACE: ICD-10-CM

## 2025-04-09 PROCEDURE — 13152 CMPLX RPR E/N/E/L 2.6-7.5 CM: CPT | Performed by: DERMATOLOGY

## 2025-04-09 PROCEDURE — 13153 CMPLX RPR E/N/E/L ADDL 5CM/<: CPT | Performed by: DERMATOLOGY

## 2025-04-09 PROCEDURE — 17311 MOHS 1 STAGE H/N/HF/G: CPT | Performed by: DERMATOLOGY

## 2025-04-09 NOTE — PROGRESS NOTES
Mohs Surgery Operative Note    Date of Surgery:  4/9/2025  Surgeon:  Ray Iglesias MD PhD  Office Location: [address] [address]  Dept: [phone]  Dept Fax: [phone]  Referring Provider: Vicenta Rodriguez, [address], [address],  [facility]      Assessment/Plan   Pre-procedure:   Obtained informed consent: written from patient  The surgical site was identified and confirmed with the patient.     Intra-operative:   Audible time out called at : 10:13 AM 04/09/25  by: Janell Mayorga RN   Verified patient name, birthdate, site, specimen bottle label & requisition.    The planned procedure(s) was again reviewed with the patient. The risks of bleeding, infection, nerve damage and scarring were reviewed. Written authorization was obtained. The patient identity, surgical site, and planned procedure(s) were verified. The provider acted as both surgeon and pathologist.     Basal cell carcinoma (BCC) of skin of other part of face  Mid Forehead    Mohs surgery    Consent obtained: written    Universal Protocol:  Procedure explained and questions answered to patient or proxy's satisfaction: Yes    Test results available and properly labeled: Yes    Pathology report reviewed: Yes    External notes reviewed: Yes    Photo or diagram used for site identification: Yes    Site/side marked: Yes    Slide independently reviewed by Mohs surgeon: Yes    Immediately prior to procedure a time out was called: Yes    Patient identity confirmed: verbally with patient  Preparation: Patient was prepped and draped in usual sterile fashion      Anticoagulation:  Is the patient taking prescription anticoagulant and/or aspirin prescribed/recommended by a physician? No    Was the anticoagulation regimen changed prior to Mohs? No      Anesthesia:  Anesthesia method: local infiltration  Local anesthetic: lidocaine 1% WITH epi    Procedure Details:  Case ID  Number: [id number]  Biopsy accession number: [id number]  Date of biopsy: 3/4/2025  Frozen section biopsy performed: No    Specimen debulked: Yes    Pre-Op diagnosis: basal cell carcinoma  BCC subtype: nodular  Surgical site (from skin exam): Mid Forehead  Pre-operative length (cm): 2  Pre-operative width (cm): 1.6  Indications for Mohs surgery: anatomic location where tissue conservation is critical  Previously treated? No      Micrographic Surgery Details:  Post-operative length (cm): 2.7  Post-operative width (cm): 3  Number of Mohs stages: 1    Stage 1     Comments: The patient was brought into the operating room and placed in the procedure chair in the appropriate position.  The area positive by previous biopsy was identified and confirmed with the patient. The area of clinically obvious tumor was debulked using a curette and/or scalpel as needed. An incision was made following the Mohs approach through the skin. The specimen was taken to the lab, divided into 4 piece(s) and appropriately chromacoded and processed.                 Tumor features identified on Mohs section: no tumor identified    Depth of defect: subcutaneous fat    Patient tolerance of procedure: tolerated well, no immediate complications    Reconstruction:  Was the defect reconstructed? Yes    Was reconstruction performed by the same Mohs surgeon? Yes    Setting of reconstruction: outpatient office  When was reconstruction performed? same day  Type of reconstruction: linear  Linear reconstruction: complex  Length of linear repair (cm): 7.6  Subcutaneous Layers (Deep Stitches)   Suture size:  5-0  Suture type:  Vicryl  Stitches:  Buried vertical mattress  Fine/surface layer approximation (top stitches)   Epidermal/Superficial suture size:  5-0  Epidermal/Superficial suture type:  Fast-absorbing gut  Stitches: simple running    Hemostasis achieved with: electrodesiccation  Outcome: patient tolerated procedure well with no complications     Post-procedure details: sterile dressing applied and wound care instructions given          Complex Linear Repair - Wide Undermining:  Given the location and size of the defect, it was determined that a complex layered linear closure was required to restore normal anatomy and function. The repair was considered complex because extensive undermining was required and performed. The amount of undermining performed was greater than the maximum width of the defect as measured perpendicular to the closure line along at least one entire edge of the defect. Standing cutaneous cones were removed using Burow's triangles. The wound edges were brought into close approximation with buried vertical mattress sutures. The remainder of the wound was then closed with epidermal top sutures.        The final repair measured 7.6 cm    Wound care was discussed, and the patient was given written post-operative wound care instructions.      The patient will follow up with Ray Iglesias MD PhD as needed for any post operative problems or concerns, and will follow up with their primary dermatologist as scheduled.

## 2025-04-09 NOTE — PROGRESS NOTES
Office Visit Note  Date: 4/9/2025  Surgeon:  Ray Iglesias MD PhD  Office Location: [address] [address]  Dept: [phone]  Dept Fax: [phone]  Referring Provider: Vicenta Rodriguez, [address], [address] [place] [address]    Subjective   Mary Ellen Gomez is a 69 y.o. female who presents for the following: MOHS Surgery (Mid forehead, BCC)    According to the patient, the lesion has been present for approximately 6 months at the time of diagnosis.  The lesion is itchy.  The lesion has not been treated previously.    The patient does not have a pacemaker / defibrillator.  The patient does not have a heart valve / joint replacement.    The patient is not on blood thinners.  The patient does not have a history of hepatitis B or C.  The patient does not have a history of HIV.  The patient does not have a history of immunosuppression (e.g. organ transplantation, malignancy, medications)    Review of Systems:  No other skin or systemic complaints other than what is documented elsewhere in the note.    MEDICAL HISTORY: clinically relevant history including significant past medical history, medications and allergies was reviewed and documented in Epic.    Objective   Well appearing patient in no apparent distress; mood and affect are within normal limits.  Vital signs: See record.  Noted on the Mid Forehead  Is a 2 x 1.6 cm scar              The patient confirmed the identified site.    Discussion:  The nature of the diagnosis was explained. The lesion is a skin cancer.  It has a risk of local growth and distant spread. The condition is associated with sun exposure.  Warning signs of non-melanoma skin cancer discussed. Patient was instructed to perform monthly self skin examination.  We recommended that the patient have regular full skin exams given an increased risk of subsequent skin cancers. The patient was instructed to use sun  protective behaviors including use of broad spectrum sunscreens and sun protective clothing to reduce risk of skin cancers.      Risks, benefits, side effects of Mohs surgery were discussed with patient and the patient voiced understanding.  It was explained that even though the cure rate of Mohs is very high it is not 100%. Risks of surgery including but not limited to bleeding, infection, numbness, nerve damage, and scar were reviewed.  Discussion included wound care requirements, activity restrictions, likely scar outcome and time to heal.     After Mohs surgery, the defect may need to be repaired surgically and the scar may be longer than the original lesion.  Reconstruction options, risks, and benefits were reviewed including second intention healing, linear repair (4-1 ratio was explained), local flaps, skin grafts, cartilage grafts and interpolation flaps (the need for multiple surgeries was explained). Possible outcomes were reviewed including likely scar appearance, failure of flap survival, infection, bleeding and the need for revision surgery.     The pathology was reviewed, the photograph was reviewed, and the referring physician's note was reviewed.    Patient elected for Mohs surgery.

## 2025-04-16 ENCOUNTER — APPOINTMENT (OUTPATIENT)
Dept: DERMATOLOGY | Facility: CLINIC | Age: 70
End: 2025-04-16
Payer: MEDICARE

## 2025-04-16 VITALS — SYSTOLIC BLOOD PRESSURE: 144 MMHG | HEART RATE: 70 BPM | DIASTOLIC BLOOD PRESSURE: 67 MMHG

## 2025-04-16 DIAGNOSIS — C44.319 BASAL CELL CARCINOMA (BCC) OF SKIN OF OTHER PART OF FACE: ICD-10-CM

## 2025-04-16 NOTE — PROGRESS NOTES
Office Visit Note  Date: 4/16/2025  Surgeon:  Ray Iglesias MD PhD  Office Location: [address] [address]  Dept: [phone]  Dept Fax: [phone]  Referring Provider: Vicenta Rodriguez, [address], [address] [place] [address]    Subjective   Mary Ellen Gomze is a 69 y.o. female who presents for the following: MOHS Surgery (Left temporal hairline, BCC)    According to the patient, the lesion has been present for approximately 2 months at the time of diagnosis.  The lesion is not healing.  The lesion has not been treated previously.    The patient does not have a pacemaker / defibrillator.  The patient does not have a heart valve / joint replacement.    The patient is not on blood thinners.  The patient does not have a history of hepatitis B or C.  The patient does not have a history of HIV.  The patient does not have a history of immunosuppression (e.g. organ transplantation, malignancy, medications)    Review of Systems:  No other skin or systemic complaints other than what is documented elsewhere in the note.    MEDICAL HISTORY: clinically relevant history including significant past medical history, medications and allergies was reviewed and documented in Epic.    Objective   Well appearing patient in no apparent distress; mood and affect are within normal limits.  Vital signs: See record.  Noted on the   Left Temporal Hairline  Is a 1.7 x 1 cm scar      The patient confirmed the identified site.    Discussion:  The nature of the diagnosis was explained. The lesion is a skin cancer.  It has a risk of local growth and distant spread. The condition is associated with sun exposure.  Warning signs of non-melanoma skin cancer discussed. Patient was instructed to perform monthly self skin examination.  We recommended that the patient have regular full skin exams given an increased risk of subsequent skin cancers. The patient was  instructed to use sun protective behaviors including use of broad spectrum sunscreens and sun protective clothing to reduce risk of skin cancers.      Risks, benefits, side effects of Mohs surgery were discussed with patient and the patient voiced understanding.  It was explained that even though the cure rate of Mohs is very high it is not 100%. Risks of surgery including but not limited to bleeding, infection, numbness, nerve damage, and scar were reviewed.  Discussion included wound care requirements, activity restrictions, likely scar outcome and time to heal.     After Mohs surgery, the defect may need to be repaired surgically and the scar may be longer than the original lesion.  Reconstruction options, risks, and benefits were reviewed including second intention healing, linear repair (4-1 ratio was explained), local flaps, skin grafts, cartilage grafts and interpolation flaps (the need for multiple surgeries was explained). Possible outcomes were reviewed including likely scar appearance, failure of flap survival, infection, bleeding and the need for revision surgery.     The pathology was reviewed, the photograph was reviewed, and the referring physician's note was reviewed.    Patient elected for Mohs surgery.

## 2025-04-16 NOTE — PROGRESS NOTES
Mohs Surgery Operative Note    Date of Surgery:  4/16/2025  Surgeon:  Ray Iglesias MD PhD  Office Location: [address] [address]  Dept: [phone]  Dept Fax: [phone]  Referring Provider: Vicenta Rodriguez, [address], [address],  [facility]      Assessment/Plan   Pre-procedure:   Obtained informed consent: written from patient  The surgical site was identified and confirmed with the patient.     Intra-operative:   Audible time out called at : 2:24 PM 04/16/25  by: Janell Mayorga RN   Verified patient name, birthdate, site, specimen bottle label & requisition.    The planned procedure(s) was again reviewed with the patient. The risks of bleeding, infection, nerve damage and scarring were reviewed. Written authorization was obtained. The patient identity, surgical site, and planned procedure(s) were verified. The provider acted as both surgeon and pathologist.     BASAL CELL CARCINOMA (BCC) OF SKIN OF OTHER PART OF FACE  Left Temporal Hairline  Mohs surgery    Consent obtained: written    Universal Protocol:  Procedure explained and questions answered to patient or proxy's satisfaction: Yes    Test results available and properly labeled: Yes    Pathology report reviewed: Yes    External notes reviewed: Yes    Photo or diagram used for site identification: Yes    Site/side marked: Yes    Slide independently reviewed by Mohs surgeon: Yes    Immediately prior to procedure a time out was called: Yes    Patient identity confirmed: verbally with patient  Preparation: Patient was prepped and draped in usual sterile fashion      Anticoagulation:  Is the patient taking prescription anticoagulant and/or aspirin prescribed/recommended by a physician? No    Was the anticoagulation regimen changed prior to Mohs? No      Anesthesia:  Anesthesia method: local infiltration  Local anesthetic: lidocaine 1% WITH epi    Procedure  Details:  Case ID Number: [id number]  Biopsy accession number: [id number]  Date of biopsy: 3/4/2025  Frozen section biopsy performed: No    Specimen debulked: Yes (infiltrative BCC)    Pre-Op diagnosis: basal cell carcinoma  BCC subtype: nodular  Surgical site (from skin exam): Left Temporal Hairline  Pre-operative length (cm): 1.7  Pre-operative width (cm): 1  Indications for Mohs surgery: anatomic location where tissue conservation is critical  Previously treated? No      Micrographic Surgery Details:  Post-operative length (cm): 3  Post-operative width (cm): 2  Number of Mohs stages: 2    Stage 1     Comments: The patient was brought into the operating room and placed in the procedure chair in the appropriate position.  The area positive by previous biopsy was identified and confirmed with the patient. The area of clinically obvious tumor was debulked using a curette and/or scalpel as needed. An incision was made following the Mohs approach through the skin. The specimen was taken to the lab, divided into 2 piece(s) and appropriately chromacoded and processed.    .  Tumor was seen on both the lateral and deep margins as indicated on the on the Mohs map.  Nodular basal cell carcinoma. Histologic examination revealed large tumor aggregates of atypical basaloid cells with peripheral palisading and tumoral clefting.              Tumor features identified on Mohs section: basal carcinoma    Stage 2     Comments: The area of positivity as noted on the Mohs map in the previous stage was identified and removed using the Mohs technique. The specimen was taken to the lab and appropriately chromacoded and processed in 1 piece(s).               Tumor features identified on Mohs section: no tumor identified    Depth of defect: subcutaneous fat    Patient tolerance of procedure: tolerated well, no immediate complications    Reconstruction:  Was the defect reconstructed? Yes    Was reconstruction performed by the same Mohs  surgeon? Yes    Setting of reconstruction: outpatient office  When was reconstruction performed? same day  Type of reconstruction: linear  Linear reconstruction: complex  Length of linear repair (cm): 6  Subcutaneous Layers (Deep Stitches)   Suture size:  5-0  Suture type:  Vicryl  Stitches:  Buried vertical mattress  Fine/surface layer approximation (top stitches)   Epidermal/Superficial suture size:  5-0  Epidermal/Superficial suture type:  Fast-absorbing gut  Stitches: simple running    Hemostasis achieved with: electrodesiccation  Outcome: patient tolerated procedure well with no complications    Post-procedure details: sterile dressing applied and wound care instructions given    Dressing type: Hypafix, pressure dressing, petrolatum and Telfa pad      Complex Linear Repair - Wide Undermining:  Given the location and size of the defect, it was determined that a complex layered linear closure was required to restore normal anatomy and function. The repair was considered complex because extensive undermining was required and performed. The amount of undermining performed was greater than the maximum width of the defect as measured perpendicular to the closure line along at least one entire edge of the defect. Standing cutaneous cones were removed using Burow's triangles. The wound edges were brought into close approximation with buried vertical mattress sutures. The remainder of the wound was then closed with epidermal top sutures.              The final repair measured 6 cm    Wound care was discussed, and the patient was given written post-operative wound care instructions.      The patient will follow up with Ray Iglesias MD PhD as needed for any post operative problems or concerns, and will follow up with their primary dermatologist as scheduled.

## 2025-04-30 ENCOUNTER — APPOINTMENT (OUTPATIENT)
Dept: DERMATOLOGY | Facility: CLINIC | Age: 70
End: 2025-04-30
Payer: MEDICARE

## 2025-05-28 ENCOUNTER — APPOINTMENT (OUTPATIENT)
Dept: DERMATOLOGY | Facility: CLINIC | Age: 70
End: 2025-05-28
Payer: MEDICARE

## 2025-05-30 ENCOUNTER — TELEPHONE (OUTPATIENT)
Dept: PRIMARY CARE | Facility: CLINIC | Age: 70
End: 2025-05-30
Payer: MEDICARE

## 2025-05-30 DIAGNOSIS — E11.42 DIABETIC POLYNEUROPATHY ASSOCIATED WITH TYPE 2 DIABETES MELLITUS: ICD-10-CM

## 2025-05-30 DIAGNOSIS — E11.69 TYPE 2 DIABETES MELLITUS WITH OTHER SPECIFIED COMPLICATION, WITHOUT LONG-TERM CURRENT USE OF INSULIN: ICD-10-CM

## 2025-05-30 RX ORDER — ATORVASTATIN CALCIUM 40 MG/1
40 TABLET, FILM COATED ORAL DAILY
Qty: 90 TABLET | Refills: 0 | Status: SHIPPED | OUTPATIENT
Start: 2025-05-30

## 2025-05-30 RX ORDER — GABAPENTIN 300 MG/1
600 CAPSULE ORAL NIGHTLY
Qty: 90 CAPSULE | Refills: 1 | Status: SHIPPED | OUTPATIENT
Start: 2025-05-30

## 2025-05-30 NOTE — TELEPHONE ENCOUNTER
REFILL  MEDICATION:     Atorvastatin 40 MG; Take 1 tablet daily.    LR: 3/12/25      90 tablets with 0 refills     Gabapentin 300 MG; Take 2 capsules daily at bedtime.    LR: 1/31/25      90 capsules with 1 refill     PHARM: [facility] NUMBER: [phone]    LV: 4/4/25  NV: 7/7/25

## 2025-06-03 ENCOUNTER — APPOINTMENT (OUTPATIENT)
Dept: DERMATOLOGY | Facility: CLINIC | Age: 70
End: 2025-06-03
Payer: MEDICARE

## 2025-06-03 DIAGNOSIS — Z51.89 VISIT FOR WOUND CHECK: ICD-10-CM

## 2025-06-03 PROCEDURE — 3052F HG A1C>EQUAL 8.0%<EQUAL 9.0%: CPT | Performed by: DERMATOLOGY

## 2025-06-03 PROCEDURE — 4010F ACE/ARB THERAPY RXD/TAKEN: CPT | Performed by: DERMATOLOGY

## 2025-06-03 PROCEDURE — 3048F LDL-C <100 MG/DL: CPT | Performed by: DERMATOLOGY

## 2025-06-03 PROCEDURE — 99024 POSTOP FOLLOW-UP VISIT: CPT | Performed by: DERMATOLOGY

## 2025-06-03 NOTE — PROGRESS NOTES
Office Follow Up Note    Visit Summary  Chief Complaint    1. Complaint Wound check.    Mary Ellen Gomez is a 70 y.o. female who presents for 6 week follow up after surgery for a basal cell carcinoma.     Location Operation site location: Left temporal hairline and left forehead    On exam,  Ms. Gomez is well-appearing and in no apparent distress. The surgical site appears clean with minimal to no erythema. No tenderness and good wound edge apposition.  + Moderate swelling and redness persists, all within normal healing    Assessment and Plan:    The patient was reassured that the wound is healing appropriately.             Follow up in 3 months to reevaluate.

## 2025-06-10 ENCOUNTER — TELEPHONE (OUTPATIENT)
Dept: PRIMARY CARE | Facility: CLINIC | Age: 70
End: 2025-06-10
Payer: MEDICARE

## 2025-06-10 DIAGNOSIS — I10 BENIGN ESSENTIAL HYPERTENSION: ICD-10-CM

## 2025-06-10 RX ORDER — LOSARTAN POTASSIUM 100 MG/1
100 TABLET ORAL DAILY
Qty: 90 TABLET | Refills: 1 | Status: SHIPPED | OUTPATIENT
Start: 2025-06-10

## 2025-06-10 NOTE — TELEPHONE ENCOUNTER
REFILL  MEDICATION:     Losartan 100 MG; Take 1 tablet daily.     PHARM: [facility]   PHARM NUMBER: [phone]    LR: 12/9/24        90 tablets with 1 refill   LV: 4/4/25  NV: 7/7/25

## 2025-07-07 ENCOUNTER — APPOINTMENT (OUTPATIENT)
Dept: PRIMARY CARE | Facility: CLINIC | Age: 70
End: 2025-07-07
Payer: MEDICARE

## 2025-07-07 VITALS
HEIGHT: 62 IN | BODY MASS INDEX: 49.32 KG/M2 | TEMPERATURE: 97.2 F | WEIGHT: 268 LBS | SYSTOLIC BLOOD PRESSURE: 102 MMHG | DIASTOLIC BLOOD PRESSURE: 66 MMHG

## 2025-07-07 DIAGNOSIS — E11.69 TYPE 2 DIABETES MELLITUS WITH OTHER SPECIFIED COMPLICATION, WITHOUT LONG-TERM CURRENT USE OF INSULIN: Primary | ICD-10-CM

## 2025-07-07 DIAGNOSIS — Z12.31 ENCOUNTER FOR SCREENING MAMMOGRAM FOR BREAST CANCER: ICD-10-CM

## 2025-07-07 DIAGNOSIS — E03.9 ACQUIRED HYPOTHYROIDISM: ICD-10-CM

## 2025-07-07 DIAGNOSIS — I10 BENIGN ESSENTIAL HYPERTENSION: ICD-10-CM

## 2025-07-07 DIAGNOSIS — E66.813 CLASS 3 SEVERE OBESITY WITH SERIOUS COMORBIDITY AND BODY MASS INDEX (BMI) OF 45.0 TO 49.9 IN ADULT, UNSPECIFIED OBESITY TYPE: ICD-10-CM

## 2025-07-07 DIAGNOSIS — E11.42 DIABETIC POLYNEUROPATHY ASSOCIATED WITH TYPE 2 DIABETES MELLITUS: ICD-10-CM

## 2025-07-07 DIAGNOSIS — Z00.00 ROUTINE GENERAL MEDICAL EXAMINATION AT HEALTH CARE FACILITY: ICD-10-CM

## 2025-07-07 DIAGNOSIS — E66.01 MORBID OBESITY WITH BMI OF 45.0-49.9, ADULT (MULTI): ICD-10-CM

## 2025-07-07 PROCEDURE — 3048F LDL-C <100 MG/DL: CPT | Performed by: FAMILY MEDICINE

## 2025-07-07 PROCEDURE — 1160F RVW MEDS BY RX/DR IN RCRD: CPT | Performed by: FAMILY MEDICINE

## 2025-07-07 PROCEDURE — 1159F MED LIST DOCD IN RCRD: CPT | Performed by: FAMILY MEDICINE

## 2025-07-07 PROCEDURE — 1158F ADVNC CARE PLAN TLK DOCD: CPT | Performed by: FAMILY MEDICINE

## 2025-07-07 PROCEDURE — 3078F DIAST BP <80 MM HG: CPT | Performed by: FAMILY MEDICINE

## 2025-07-07 PROCEDURE — G0439 PPPS, SUBSEQ VISIT: HCPCS | Performed by: FAMILY MEDICINE

## 2025-07-07 PROCEDURE — 3074F SYST BP LT 130 MM HG: CPT | Performed by: FAMILY MEDICINE

## 2025-07-07 PROCEDURE — 1036F TOBACCO NON-USER: CPT | Performed by: FAMILY MEDICINE

## 2025-07-07 PROCEDURE — 4010F ACE/ARB THERAPY RXD/TAKEN: CPT | Performed by: FAMILY MEDICINE

## 2025-07-07 PROCEDURE — 3052F HG A1C>EQUAL 8.0%<EQUAL 9.0%: CPT | Performed by: FAMILY MEDICINE

## 2025-07-07 PROCEDURE — 3008F BODY MASS INDEX DOCD: CPT | Performed by: FAMILY MEDICINE

## 2025-07-07 PROCEDURE — 1170F FXNL STATUS ASSESSED: CPT | Performed by: FAMILY MEDICINE

## 2025-07-07 ASSESSMENT — ENCOUNTER SYMPTOMS
ADENOPATHY: 0
SHORTNESS OF BREATH: 0
DIARRHEA: 0
DEPRESSION: 0
NUMBNESS: 0
ABDOMINAL PAIN: 1
CONSTIPATION: 0
SLEEP DISTURBANCE: 0
VOICE CHANGE: 0
ARTHRALGIAS: 0
FEVER: 0
SINUS PRESSURE: 0
WEAKNESS: 0
FATIGUE: 0
VOMITING: 0
PALPITATIONS: 0
HEADACHES: 0
NERVOUS/ANXIOUS: 0
HEMATURIA: 0
COUGH: 0
WHEEZING: 0
NAUSEA: 0
DYSPHORIC MOOD: 0
DIZZINESS: 0
FREQUENCY: 0
MYALGIAS: 0
BLOOD IN STOOL: 0
TROUBLE SWALLOWING: 1
BACK PAIN: 1
LOSS OF SENSATION IN FEET: 0
DYSURIA: 0
RHINORRHEA: 0
OCCASIONAL FEELINGS OF UNSTEADINESS: 0
WOUND: 0
SORE THROAT: 0

## 2025-07-07 ASSESSMENT — ACTIVITIES OF DAILY LIVING (ADL)
GROCERY_SHOPPING: INDEPENDENT
TAKING_MEDICATION: INDEPENDENT
DOING_HOUSEWORK: INDEPENDENT
MANAGING_FINANCES: INDEPENDENT
BATHING: INDEPENDENT
DRESSING: INDEPENDENT

## 2025-07-07 NOTE — ASSESSMENT & PLAN NOTE
Orders:    CBC and Auto Differential    Comprehensive Metabolic Panel    Urinalysis with Reflex Microscopic

## 2025-07-07 NOTE — PROGRESS NOTES
"Subjective   Reason for Visit: Mary Ellen Gomez is an 70 y.o. female here for a Medicare Wellness visit.        She is here for a MWV.      She has had no hospitalization or surgery in the last year except MOHS surgery for BCC above the right eye.      Medications Ordered Prior to Encounter[1]      Tobacco Use: Medium Risk (7/7/2025)    Patient History     Smoking Tobacco Use: Former     Smokeless Tobacco Use: Never     Passive Exposure: Past   She quit smoking 35 years ago.  Alcohol.  Two drinks maybe once a month.  No drug use.  She does not exercise \"like I should\".  She maintains a healthy diet \"on and off\".    Has advanced directives.  Advised to bring in copy of it.  Full code.  Sister would be POA.        Reviewed all medications by prescribing practitioner or clinical pharmacist (such as prescriptions, OTCs, herbal therapies and supplements) and documented in the medical record.    HPI    Patient Care Team:  Ponce Amos DO as PCP - General  Ponce Amos DO as PCP - [facility] Attributed Provider     Review of Systems   Constitutional:  Negative for fatigue and fever.   HENT:  Positive for trouble swallowing (has pain with swallowing if she does not drink enough water.  She follows GI and has had esophageal dilatation.  she has a CT scheduled tomorrow on abdomen and pelvis ordered by GI.). Negative for rhinorrhea, sinus pressure, sore throat and voice change.    Respiratory:  Negative for cough, shortness of breath and wheezing.    Cardiovascular:  Negative for chest pain, palpitations and leg swelling.   Gastrointestinal:  Positive for abdominal pain. Negative for blood in stool, constipation, diarrhea, nausea and vomiting.   Genitourinary:  Negative for dysuria, frequency, hematuria and vaginal bleeding.   Musculoskeletal:  Positive for back pain. Negative for arthralgias and myalgias.   Skin:  Negative for rash and wound.        No new moles following MOHS surgery.  Still sees dermatology.  Sees " "them in September.   Neurological:  Negative for dizziness, syncope, weakness, numbness and headaches.   Hematological:  Negative for adenopathy.   Psychiatric/Behavioral:  Negative for dysphoric mood, self-injury, sleep disturbance and suicidal ideas. The patient is not nervous/anxious.        Objective   Vitals:  /66   Temp 36.2 °C (97.2 °F) (Skin)   Ht 1.575 m (5' 2\")   Wt 122 kg (268 lb)   BMI 49.02 kg/m²       Physical Exam  Vitals reviewed.   Constitutional:       General: She is not in acute distress.     Appearance: Normal appearance. She is obese. She is not ill-appearing or toxic-appearing.   HENT:      Head: Normocephalic and atraumatic.      Right Ear: Tympanic membrane, ear canal and external ear normal.      Left Ear: Tympanic membrane, ear canal and external ear normal.      Nose: Nose normal.      Mouth/Throat:      Mouth: Mucous membranes are moist.   Eyes:      Extraocular Movements: Extraocular movements intact.      Conjunctiva/sclera: Conjunctivae normal.      Pupils: Pupils are equal, round, and reactive to light.   Cardiovascular:      Rate and Rhythm: Normal rate and regular rhythm.      Heart sounds: No murmur heard.  Pulmonary:      Effort: Pulmonary effort is normal.      Breath sounds: Normal breath sounds.   Abdominal:      General: Bowel sounds are normal. There is no distension.      Palpations: Abdomen is soft. There is no mass.      Tenderness: There is abdominal tenderness (mild tenderness in the mid lower abdomen.). There is no guarding or rebound.   Musculoskeletal:         General: No tenderness.      Cervical back: Neck supple.      Right lower leg: No edema.      Left lower leg: No edema.   Skin:     Coloration: Skin is not jaundiced or pale.      Findings: No rash.   Neurological:      General: No focal deficit present.      Mental Status: She is alert and oriented to person, place, and time. Mental status is at baseline.   Psychiatric:         Mood and Affect: Mood " normal.         Behavior: Behavior normal.         Thought Content: Thought content normal.         Judgment: Judgment normal.         Assessment & Plan  Morbid obesity with BMI of 45.0-49.9, adult (Multi)         Body mass index (BMI) 40.0-44.9, adult (Multi)         Class 3 severe obesity with serious comorbidity and body mass index (BMI) of 45.0 to 49.9 in adult, unspecified obesity type         Body mass index (BMI) 45.0-49.9, adult (Multi)         Type 2 diabetes mellitus with other specified complication, without long-term current use of insulin    Orders:    Hemoglobin A1C    Lipid Panel    Albumin-Creatinine Ratio, Urine Random    Benign essential hypertension    Orders:    CBC and Auto Differential    Comprehensive Metabolic Panel    Urinalysis with Reflex Microscopic    Diabetic polyneuropathy associated with type 2 diabetes mellitus         Acquired hypothyroidism    Orders:    Thyroid Stimulating Hormone    Routine general medical examination at health care facility    Orders:    1 Year Follow Up In Primary Care - Wellness Exam; Future    Encounter for screening mammogram for breast cancer    Orders:    BI mammo bilateral screening tomosynthesis; Future     Annual Wellness exam completed   Preventive Health history reviewed:  Labs ordered    Mammogram ordered.  Last done 7/2024.  BMD done 7/2024.  Due next year.  Cscope done 2024.  One polyp found.  Told to repeat in five years.  Low dose CT chest for lung cancer screening not indicated based upon smoking history.  Depression Screening done  Advanced Directives Discussion Completed  Cardiovascular risk discussed and if needed, lifestyle modifications recommended, including nutritional choices, exercise, and elimination of habits contributing to risk.  We agreed on a plan to reduce the current cardiovascular risk.  See ecalc ASCVD Risk  Plus for data discussed regarding risk and risk reduction opportunities.  Aspirin use not indicated after  reviewing the updated guidelines.   The ASCVD Risk score (Sara MAN, et al., 2019) failed to calculate for the following reasons:    The valid total cholesterol range is 130 to 320 mg/dL    Vaccines:  Influenza recommended yearly in the fall.  Prevnar 20 done 2024  Prevnar 13 done 2017  Pneumovax 23 done 2021  Shingrix completed 2018    I will order labs today and a screening mammogram. Weight loss is very important to your good health.  Follow up with GI and dermatology as directed.  Return in three months for a recheck on diabetes.                  [1]   Current Outpatient Medications on File Prior to Visit   Medication Sig Dispense Refill    albuterol (ProAir HFA) 90 mcg/actuation inhaler Inhale 2 puffs every 4 hours if needed for wheezing or shortness of breath. 8.5 g 0    allopurinol (Zyloprim) 300 mg tablet Take 1 tablet (300 mg) by mouth once daily. 90 tablet 0    atorvastatin (Lipitor) 40 mg tablet Take 1 tablet (40 mg) by mouth once daily. 90 tablet 0    clindamycin (Cleocin T) 1 % gel Apply 1 Application topically 2 times a day. 60 g 0    dicyclomine (Bentyl) 10 mg capsule Take 1 capsule (10 mg) by mouth 3 times a day. 90 capsule 1    gabapentin (Neurontin) 300 mg capsule Take 2 capsules (600 mg) by mouth once daily at bedtime. 90 capsule 1    glipiZIDE XL (Glucotrol XL) 10 mg 24 hr tablet Take 1 tablet (10 mg) by mouth 2 times a day. 180 tablet 1    ibuprofen 800 mg tablet Take 1 tablet (800 mg) by mouth every 8 hours if needed for mild pain (1 - 3). 90 tablet 0    levothyroxine (Synthroid, Levoxyl) 137 mcg tablet Take 1 tablet (137 mcg) by mouth once daily. 90 tablet 1    losartan (Cozaar) 100 mg tablet Take 1 tablet (100 mg) by mouth once daily. 90 tablet 1    mupirocin (Bactroban) 2 % ointment Apply 1 Application topically.      pioglitazone (Actos) 30 mg tablet Take 1 tablet (30 mg) by mouth once daily. 90 tablet 0    tretinoin (Retin-A) 0.05 % cream Apply 1 Application topically once daily at  bedtime. 45 g 0    [DISCONTINUED] dulaglutide (Trulicity) 0.75 mg/0.5 mL pen injector Inject 0.75 mg under the skin 1 (one) time per week. 2 mL 3     No current facility-administered medications on file prior to visit.

## 2025-07-07 NOTE — PATIENT INSTRUCTIONS
I will order labs today and a screening mammogram. Weight loss is very important to your good health.  Follow up with GI and dermatology as directed.  Return in three months for a recheck on diabetes.

## 2025-07-08 ENCOUNTER — APPOINTMENT (OUTPATIENT)
Dept: RADIOLOGY | Facility: CLINIC | Age: 70
End: 2025-07-08
Payer: MEDICARE

## 2025-07-08 LAB
ALBUMIN SERPL-MCNC: 4.3 G/DL (ref 3.6–5.1)
ALBUMIN/CREAT UR: 7 MG/G CREAT
ALP SERPL-CCNC: 59 U/L (ref 37–153)
ALT SERPL-CCNC: 24 U/L (ref 6–29)
ANION GAP SERPL CALCULATED.4IONS-SCNC: 10 MMOL/L (CALC) (ref 7–17)
APPEARANCE UR: CLEAR
AST SERPL-CCNC: 17 U/L (ref 10–35)
BACTERIA #/AREA URNS HPF: ABNORMAL /HPF
BASOPHILS # BLD AUTO: 32 CELLS/UL (ref 0–200)
BASOPHILS NFR BLD AUTO: 0.5 %
BILIRUB SERPL-MCNC: 0.8 MG/DL (ref 0.2–1.2)
BILIRUB UR QL STRIP: NEGATIVE
BUN SERPL-MCNC: 28 MG/DL (ref 7–25)
CALCIUM SERPL-MCNC: 9.2 MG/DL (ref 8.6–10.4)
CHLORIDE SERPL-SCNC: 105 MMOL/L (ref 98–110)
CHOLEST SERPL-MCNC: 174 MG/DL
CHOLEST/HDLC SERPL: 3.1 (CALC)
CO2 SERPL-SCNC: 25 MMOL/L (ref 20–32)
COLOR UR: YELLOW
CREAT SERPL-MCNC: 0.88 MG/DL (ref 0.5–1.05)
CREAT UR-MCNC: 133 MG/DL (ref 20–275)
EGFRCR SERPLBLD CKD-EPI 2021: 71 ML/MIN/1.73M2
EOSINOPHIL # BLD AUTO: 69 CELLS/UL (ref 15–500)
EOSINOPHIL NFR BLD AUTO: 1.1 %
ERYTHROCYTE [DISTWIDTH] IN BLOOD BY AUTOMATED COUNT: 12.3 % (ref 11–15)
EST. AVERAGE GLUCOSE BLD GHB EST-MCNC: 237 MG/DL
EST. AVERAGE GLUCOSE BLD GHB EST-SCNC: 13.2 MMOL/L
GLUCOSE SERPL-MCNC: 257 MG/DL (ref 65–99)
GLUCOSE UR QL STRIP: ABNORMAL
HBA1C MFR BLD: 9.9 %
HCT VFR BLD AUTO: 44.3 % (ref 35–45)
HDLC SERPL-MCNC: 57 MG/DL
HGB BLD-MCNC: 14.3 G/DL (ref 11.7–15.5)
HGB UR QL STRIP: NEGATIVE
HYALINE CASTS #/AREA URNS LPF: ABNORMAL /LPF
KETONES UR QL STRIP: NEGATIVE
LDLC SERPL CALC-MCNC: 94 MG/DL (CALC)
LEUKOCYTE ESTERASE UR QL STRIP: ABNORMAL
LYMPHOCYTES # BLD AUTO: 1928 CELLS/UL (ref 850–3900)
LYMPHOCYTES NFR BLD AUTO: 30.6 %
MCH RBC QN AUTO: 31.8 PG (ref 27–33)
MCHC RBC AUTO-ENTMCNC: 32.3 G/DL (ref 32–36)
MCV RBC AUTO: 98.7 FL (ref 80–100)
MICROALBUMIN UR-MCNC: 0.9 MG/DL
MONOCYTES # BLD AUTO: 473 CELLS/UL (ref 200–950)
MONOCYTES NFR BLD AUTO: 7.5 %
NEUTROPHILS # BLD AUTO: 3799 CELLS/UL (ref 1500–7800)
NEUTROPHILS NFR BLD AUTO: 60.3 %
NITRITE UR QL STRIP: NEGATIVE
NONHDLC SERPL-MCNC: 117 MG/DL (CALC)
PH UR STRIP: 5.5 [PH] (ref 5–8)
PLATELET # BLD AUTO: 218 THOUSAND/UL (ref 140–400)
PMV BLD REES-ECKER: 10.9 FL (ref 7.5–12.5)
POTASSIUM SERPL-SCNC: 4.6 MMOL/L (ref 3.5–5.3)
PROT SERPL-MCNC: 7 G/DL (ref 6.1–8.1)
PROT UR QL STRIP: NEGATIVE
RBC # BLD AUTO: 4.49 MILLION/UL (ref 3.8–5.1)
RBC #/AREA URNS HPF: ABNORMAL /HPF
SERVICE CMNT-IMP: ABNORMAL
SODIUM SERPL-SCNC: 140 MMOL/L (ref 135–146)
SP GR UR STRIP: 1.02 (ref 1–1.03)
SQUAMOUS #/AREA URNS HPF: ABNORMAL /HPF
TRIGL SERPL-MCNC: 133 MG/DL
TSH SERPL-ACNC: 2.09 MIU/L (ref 0.4–4.5)
WBC # BLD AUTO: 6.3 THOUSAND/UL (ref 3.8–10.8)
WBC #/AREA URNS HPF: ABNORMAL /HPF

## 2025-07-11 ENCOUNTER — TELEPHONE (OUTPATIENT)
Dept: PRIMARY CARE | Facility: CLINIC | Age: 70
End: 2025-07-11
Payer: MEDICARE

## 2025-07-11 DIAGNOSIS — E11.69 TYPE 2 DIABETES MELLITUS WITH OTHER SPECIFIED COMPLICATION, WITHOUT LONG-TERM CURRENT USE OF INSULIN: ICD-10-CM

## 2025-07-11 DIAGNOSIS — M10.9 GOUT, UNSPECIFIED CAUSE, UNSPECIFIED CHRONICITY, UNSPECIFIED SITE: ICD-10-CM

## 2025-07-11 RX ORDER — ALLOPURINOL 300 MG/1
300 TABLET ORAL DAILY
Qty: 90 TABLET | Refills: 0 | Status: SHIPPED | OUTPATIENT
Start: 2025-07-11

## 2025-07-11 RX ORDER — PIOGLITAZONE 30 MG/1
30 TABLET ORAL DAILY
Qty: 90 TABLET | Refills: 0 | Status: SHIPPED | OUTPATIENT
Start: 2025-07-11

## 2025-07-11 NOTE — TELEPHONE ENCOUNTER
REFILL  MEDICATION:     Pioglitazone 30 MG; Take 1 tablet daily.    Allopurinol 300 MG; Take 1 tablet daily.    PHARM: [facility]   PHARM NUMBER: [phone]    LR: 3/31/25      90 tablets with 0 refills for both meds  LV: 7/7/25  NV: No future appt.

## 2025-07-14 ENCOUNTER — APPOINTMENT (OUTPATIENT)
Dept: RADIOLOGY | Facility: CLINIC | Age: 70
End: 2025-07-14
Payer: MEDICARE

## 2025-07-14 DIAGNOSIS — E11.69 TYPE 2 DIABETES MELLITUS WITH OTHER SPECIFIED COMPLICATION, WITHOUT LONG-TERM CURRENT USE OF INSULIN: Primary | ICD-10-CM

## 2025-07-14 RX ORDER — METFORMIN HYDROCHLORIDE 1000 MG/1
1000 TABLET ORAL
Qty: 60 TABLET | Refills: 3 | Status: SHIPPED | OUTPATIENT
Start: 2025-07-14 | End: 2025-07-17 | Stop reason: SINTOL

## 2025-07-17 ENCOUNTER — APPOINTMENT (OUTPATIENT)
Dept: RADIOLOGY | Facility: CLINIC | Age: 70
End: 2025-07-17
Payer: MEDICARE

## 2025-07-17 DIAGNOSIS — E11.69 TYPE 2 DIABETES MELLITUS WITH OTHER SPECIFIED COMPLICATION, WITHOUT LONG-TERM CURRENT USE OF INSULIN: Primary | ICD-10-CM

## 2025-07-17 RX ORDER — DULAGLUTIDE 0.75 MG/.5ML
0.75 INJECTION, SOLUTION SUBCUTANEOUS WEEKLY
Qty: 2 ML | Refills: 3 | Status: SHIPPED | OUTPATIENT
Start: 2025-07-17

## 2025-07-18 ENCOUNTER — HOSPITAL ENCOUNTER (OUTPATIENT)
Dept: RADIOLOGY | Facility: HOSPITAL | Age: 70
Discharge: HOME | End: 2025-07-18
Payer: MEDICARE

## 2025-07-18 DIAGNOSIS — R10.30 LOWER ABDOMINAL PAIN, UNSPECIFIED: ICD-10-CM

## 2025-07-18 PROCEDURE — A9698 NON-RAD CONTRAST MATERIALNOC: HCPCS

## 2025-07-18 PROCEDURE — 74177 CT ABD & PELVIS W/CONTRAST: CPT

## 2025-07-18 PROCEDURE — 2550000001 HC RX 255 CONTRASTS

## 2025-07-18 RX ADMIN — IOHEXOL 75 ML: 350 INJECTION, SOLUTION INTRAVENOUS at 18:32

## 2025-07-18 RX ADMIN — IOHEXOL 500 ML: 12 SOLUTION ORAL at 18:34

## 2025-07-21 ENCOUNTER — APPOINTMENT (OUTPATIENT)
Dept: RADIOLOGY | Facility: HOSPITAL | Age: 70
End: 2025-07-21
Payer: MEDICARE

## 2025-08-11 ENCOUNTER — TELEPHONE (OUTPATIENT)
Dept: PRIMARY CARE | Facility: CLINIC | Age: 70
End: 2025-08-11
Payer: MEDICARE

## 2025-08-11 DIAGNOSIS — E11.69 TYPE 2 DIABETES MELLITUS WITH OTHER SPECIFIED COMPLICATION, WITHOUT LONG-TERM CURRENT USE OF INSULIN: ICD-10-CM

## 2025-08-11 RX ORDER — GLIPIZIDE 10 MG/1
10 TABLET, FILM COATED, EXTENDED RELEASE ORAL 2 TIMES DAILY
Qty: 180 TABLET | Refills: 1 | Status: SHIPPED | OUTPATIENT
Start: 2025-08-11

## 2025-09-03 ENCOUNTER — TELEPHONE (OUTPATIENT)
Dept: PRIMARY CARE | Facility: CLINIC | Age: 70
End: 2025-09-03
Payer: MEDICARE

## 2025-09-03 DIAGNOSIS — E11.69 TYPE 2 DIABETES MELLITUS WITH OTHER SPECIFIED COMPLICATION, WITHOUT LONG-TERM CURRENT USE OF INSULIN: ICD-10-CM

## 2025-09-04 RX ORDER — ATORVASTATIN CALCIUM 40 MG/1
40 TABLET, FILM COATED ORAL DAILY
Qty: 90 TABLET | Refills: 0 | Status: SHIPPED | OUTPATIENT
Start: 2025-09-04

## 2025-09-09 ENCOUNTER — APPOINTMENT (OUTPATIENT)
Dept: DERMATOLOGY | Facility: CLINIC | Age: 70
End: 2025-09-09
Payer: MEDICARE